# Patient Record
Sex: MALE | Race: WHITE | NOT HISPANIC OR LATINO | Employment: OTHER | URBAN - METROPOLITAN AREA
[De-identification: names, ages, dates, MRNs, and addresses within clinical notes are randomized per-mention and may not be internally consistent; named-entity substitution may affect disease eponyms.]

---

## 2017-01-05 ENCOUNTER — TRANSFERRED RECORDS (OUTPATIENT)
Dept: HEALTH INFORMATION MANAGEMENT | Facility: CLINIC | Age: 62
End: 2017-01-05

## 2017-11-14 LAB
CHOLEST SERPL-MCNC: 200 MG/DL (ref 0–199)
HBA1C MFR BLD: 5.6 % (ref 4.3–5.6)
HDLC SERPL-MCNC: 47 MG/DL
LDLC SERPL CALC-MCNC: 126 MG/DL (ref 0–129)
TRIGL SERPL-MCNC: 137 MG/DL (ref 0–149)

## 2017-11-21 ENCOUNTER — TRANSFERRED RECORDS (OUTPATIENT)
Dept: HEALTH INFORMATION MANAGEMENT | Facility: CLINIC | Age: 62
End: 2017-11-21

## 2017-11-28 ENCOUNTER — TRANSFERRED RECORDS (OUTPATIENT)
Dept: HEALTH INFORMATION MANAGEMENT | Facility: CLINIC | Age: 62
End: 2017-11-28

## 2017-11-30 ENCOUNTER — TRANSFERRED RECORDS (OUTPATIENT)
Dept: HEALTH INFORMATION MANAGEMENT | Facility: CLINIC | Age: 62
End: 2017-11-30

## 2017-12-03 ENCOUNTER — TRANSFERRED RECORDS (OUTPATIENT)
Dept: HEALTH INFORMATION MANAGEMENT | Facility: CLINIC | Age: 62
End: 2017-12-03

## 2018-01-18 ENCOUNTER — TRANSFERRED RECORDS (OUTPATIENT)
Dept: HEALTH INFORMATION MANAGEMENT | Facility: CLINIC | Age: 63
End: 2018-01-18

## 2018-01-30 ENCOUNTER — TRANSFERRED RECORDS (OUTPATIENT)
Dept: HEALTH INFORMATION MANAGEMENT | Facility: CLINIC | Age: 63
End: 2018-01-30

## 2018-07-05 ENCOUNTER — OFFICE VISIT (OUTPATIENT)
Dept: OTOLARYNGOLOGY | Facility: CLINIC | Age: 63
End: 2018-07-05
Payer: COMMERCIAL

## 2018-07-05 VITALS — WEIGHT: 196 LBS | HEIGHT: 71 IN | BODY MASS INDEX: 27.44 KG/M2

## 2018-07-05 DIAGNOSIS — C01 MALIGNANT NEOPLASM OF BASE OF TONGUE (H): Primary | ICD-10-CM

## 2018-07-05 ASSESSMENT — PAIN SCALES - GENERAL: PAINLEVEL: NO PAIN (0)

## 2018-07-05 NOTE — PROGRESS NOTES
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      TREATMENT:  The patient received concurrent chemoradiation therapy and finished treatment on 7/10/2012.  He underwent left neck dissection for residual disease for a positive PET/CT on 10/01/2012.  Pathology report of the neck dissection shows no evidence of carcinoma.      HISTORY OF PRESENT ILLNESS:  Mr. Coleman is a 63-year-old gentleman who is back to the Otolaryngology Clinic after I saw him last time over two years ago.  He is complaining of left-sided preauricular pain.  He states that the pain started with no trigger factors.  He had this pain for about a couple weeks with no improvement but the pain in the last few days went away any totally disappeared.  He is telling me that the pain was worse when he opens his mouth widely.  It is not associated with chewing.  He denies any difficulty swallowing, no dysphagia.  He is not losing any weight.  He denies otalgia.  He does pinpoint to the TMJ on the left.        MEDICATIONS:     Current Outpatient Prescriptions   Medication Sig Dispense Refill     ibuprofen (ADVIL/MOTRIN) 600 MG tablet Take 1 tablet (600 mg) by mouth every 6 hours as needed for pain (mild) 30 tablet 0       ALLERGIES:    Allergies   Allergen Reactions     Vicodin [Hydrocodone-Acetaminophen] Other (See Comments)     Patient gets headache with Vicodin       PAST MEDICAL HISTORY:   Past Medical History:   Diagnosis Date     Cancer of tongue (H)     s/p chemo&rad rx        FAMILY HISTORY:    Family History   Problem Relation Age of Onset     Diabetes Brother      aunt, grandmother     Allergies Mother      Obesity Brother      Psychotic Disorder Sister      Cancer Father      prostate cancer     Hearing Loss No family hx of      Diabetes Brother      Diabetes Maternal Grandmother      Mental Illness Sister      bipolar     Obesity Brother        REVIEW OF SYSTEMS:  12 point ROS was negative other than the  symptoms noted above in the HPI.      PHYSICAL EXAMINATION:   Constitutional: The patient was well-groomed and in no acute distress.    Skin: Warm and pink.    Neurologic: Alert and oriented x3. Cranial nerves III-XII within normal limits. Voice quality is normal.    Psychiatric: The patient's affect was calm, cooperative and appropriate.    Respiratory: Breathing comfortably without stridor or exertion of accessory muscles.    Eyes: Pupils were equal and reactive. Extraocular movements are intact.    Head: Normocephalic and atraumatic. No lesions or scars.    Ears: External auditory canals and tympanic membranes were clear.    Nose: Sinuses were nontender. Anterior rhinoscopy revealed midline septum and absence of purulence or polyps.    Oral cavity/Oropharynx: Normal mucosa.  The oropharynx shows evidence of telangiectasias most likely secondary to radiation therapy.  I do not see any evidence of masses or lesions on today's examination.    Neck: The parotids are soft without masses. Supple with normal laryngeal and tracheal landmarks.  Evidence of left neck dissection.  Lymphatic: There is no palpable lymphadenopathy or other masses in the neck or parotids.       ASSESSMENT AND PLAN:  This is a 63-year-old gentleman with a history of left tongue base squamous cell carcinoma, HPV positive, T2, N2c M0.  The patient had a left preauricular pain that has disappeared.  Today's examination did not reveal any evidence concerning for recurrence or any new or second primary tumors.  Our plan is to see him back on a p.r.n. basis.         ECG/ms     Usha Rojas M.D.  Otolaryngology- Head & Neck Surgery  [phone]

## 2018-07-05 NOTE — PATIENT INSTRUCTIONS
1.  You were seen in the ENT Clinic today by Dr. Rodriguez.  If you have any questions or concerns after your appointment, please call [phone].  Press option #1 for scheduling related needs.  Press option #3 for Nurse advice.  2.  Plan is to return to clinic as needed for cancer surveillance.

## 2018-07-05 NOTE — LETTER
7/5/2018       RE: Karlos Coleman  [address] [facility] [address]     Dear Colleague,    Thank you for referring your patient, Karlos Coleman, to the [facility] EAR NOSE AND THROAT at [facility]. Please see a copy of my visit note below.    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      TREATMENT:  The patient received concurrent chemoradiation therapy and finished treatment on 7/10/2012.  He underwent left neck dissection for residual disease for a positive PET/CT on 10/01/2012.  Pathology report of the neck dissection shows no evidence of carcinoma.      HISTORY OF PRESENT ILLNESS:  Mr. Coleman is a 63-year-old gentleman who is back to the Otolaryngology Clinic after I saw him last time over two years ago.  He is complaining of left-sided preauricular pain.  He states that the pain started with no trigger factors.  He had this pain for about a couple weeks with no improvement but the pain in the last few days went away any totally disappeared.  He is telling me that the pain was worse when he opens his mouth widely.  It is not associated with chewing.  He denies any difficulty swallowing, no dysphagia.  He is not losing any weight.  He denies otalgia.  He does pinpoint to the TMJ on the left.      MEDICATIONS:     Current Outpatient Prescriptions   Medication Sig Dispense Refill     ibuprofen (ADVIL/MOTRIN) 600 MG tablet Take 1 tablet (600 mg) by mouth every 6 hours as needed for pain (mild) 30 tablet 0       ALLERGIES:    Allergies   Allergen Reactions     Vicodin [Hydrocodone-Acetaminophen] Other (See Comments)     Patient gets headache with Vicodin       PAST MEDICAL HISTORY:   Past Medical History:   Diagnosis Date     Cancer of tongue (H)     s/p chemo&rad rx        FAMILY HISTORY:    Family History   Problem Relation Age of Onset     Diabetes Brother      aunt, grandmother     Allergies Mother      Obesity  Brother      Psychotic Disorder Sister      Cancer Father      prostate cancer     Hearing Loss No family hx of      Diabetes Brother      Diabetes Maternal Grandmother      Mental Illness Sister      bipolar     Obesity Brother        REVIEW OF SYSTEMS:  12 point ROS was negative other than the symptoms noted above in the HPI.    PHYSICAL EXAMINATION:   Constitutional: The patient was well-groomed and in no acute distress.    Skin: Warm and pink.    Neurologic: Alert and oriented x3. Cranial nerves III-XII within normal limits. Voice quality is normal.    Psychiatric: The patient's affect was calm, cooperative and appropriate.    Respiratory: Breathing comfortably without stridor or exertion of accessory muscles.    Eyes: Pupils were equal and reactive. Extraocular movements are intact.    Head: Normocephalic and atraumatic. No lesions or scars.    Ears: External auditory canals and tympanic membranes were clear.    Nose: Sinuses were nontender. Anterior rhinoscopy revealed midline septum and absence of purulence or polyps.    Oral cavity/Oropharynx: Normal mucosa.  The oropharynx shows evidence of telangiectasias most likely secondary to radiation therapy.  I do not see any evidence of masses or lesions on today's examination.    Neck: The parotids are soft without masses. Supple with normal laryngeal and tracheal landmarks.  Evidence of left neck dissection.  Lymphatic: There is no palpable lymphadenopathy or other masses in the neck or parotids.       ASSESSMENT AND PLAN:  This is a 63-year-old gentleman with a history of left tongue base squamous cell carcinoma, HPV positive, T2, N2c M0.  The patient had a left preauricular pain that has disappeared.  Today's examination did not reveal any evidence concerning for recurrence or any new or second primary tumors.  Our plan is to see him back on a p.r.n. basis.         ECG/ms Usha Rojas M.D.  Otolaryngology- Head & Neck  Surgery  [phone]

## 2018-07-05 NOTE — MR AVS SNAPSHOT
After Visit Summary   7/5/2018    Karlos Coleman    MRN: [id number]           Patient Information     Date Of Birth          1955        Visit Information        Provider Department      7/5/2018 1:40 PM Usha Rojas MD [facility] Ear Nose and Throat        Today's Diagnoses     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)    -  1      Care Instructions    1.  You were seen in the ENT Clinic today by Dr. Rodriguez.  If you have any questions or concerns after your appointment, please call [phone].  Press option #1 for scheduling related needs.  Press option #3 for Nurse advice.  2.  Plan is to return to clinic as needed for cancer surveillance.                  Follow-ups after your visit        Who to contact     Please call your clinic at [phone] to:    Ask questions about your health    Make or cancel appointments    Discuss your medicines    Learn about your test results    Speak to your doctor            Additional Information About Your Visit        "[email or web address] Information     [employer] gives you secure access to your electronic health record. If you see a primary care provider, you can also send messages to your care team and make appointments. If you have questions, please call your primary care clinic.  If you do not have a primary care provider, please call [phone] and they will assist you.      [employer] is an electronic gateway that provides easy, online access to your medical records. With [employer], you can request a clinic appointment, read your test results, renew a prescription or communicate with your care team.     To access your existing account, please contact your [facility] or call [phone] for assistance.        Care EveryWhere ID     This is your Care EveryWhere ID. This could be used by other organizations to access your [place] medical records  [id number]        Your Vitals Were     Height BMI (Body Mass  "Index)                1.803 m (5' 11\") 27.34 kg/m2           Blood Pressure from Last 3 Encounters:   12/29/16 109/70   12/13/16 118/76   10/20/16 108/78    Weight from Last 3 Encounters:   07/05/18 88.9 kg (196 lb)   12/29/16 88.5 kg (195 lb)   12/13/16 91.2 kg (201 lb 1.6 oz)              Today, you had the following     No orders found for display       Primary Care Provider Office Phone # Fax #    Drew Raines [phone] [address] [facility] [address]        Equal Access to Services     [facility] : Hadii juan scott hadjesus Sotoro, waaxda luqadaha, qaybta kaalmada adedevonyada, wesly sanchez . So [facility] [phone].    ATENCIÓN: Si habla español, tiene a randall disposición servicios gratuitos de asistencia lingüística. Llame al [phone].    We comply with applicable federal civil rights laws and [place] laws. We do not discriminate on the basis of race, color, national origin, age, disability, sex, sexual orientation, or gender identity.            Thank you!     Thank you for choosing [facility] EAR NOSE AND THROAT  for your care. Our goal is always to provide you with excellent care. Hearing back from our patients is one way we can continue to improve our services. Please take a few minutes to complete the written survey that you may receive in the mail after your visit with us. Thank you!             Your Updated Medication List - Protect others around you: Learn how to safely use, store and throw away your medicines at www.disposemymeds.org.          This list is accurate as of 7/5/18 11:59 PM.  Always use your most recent med list.                   Brand Name Dispense Instructions for use Diagnosis    ibuprofen 600 MG tablet    ADVIL/MOTRIN    30 tablet    Take 1 tablet (600 mg) by mouth every 6 hours as needed for pain (mild)    Complex tear of medial meniscus of right knee as current injury, initial encounter         "

## 2018-07-05 NOTE — NURSING NOTE
"Chief Complaint   Patient presents with     RECHECK     mouth pain     Height 1.803 m (5' 11\"), weight 88.9 kg (196 lb).    Jason Jones    "

## 2018-12-03 ENCOUNTER — TRANSFERRED RECORDS (OUTPATIENT)
Dept: HEALTH INFORMATION MANAGEMENT | Facility: CLINIC | Age: 63
End: 2018-12-03

## 2019-10-03 ENCOUNTER — HEALTH MAINTENANCE LETTER (OUTPATIENT)
Age: 64
End: 2019-10-03

## 2020-07-06 ENCOUNTER — VIRTUAL VISIT (OUTPATIENT)
Dept: FAMILY MEDICINE | Facility: OTHER | Age: 65
End: 2020-07-06

## 2020-07-06 NOTE — PROGRESS NOTES
"Date: 2020 09:16:20  Clinician: James Hays  Clinician NPI: [id number]  Patient: Karlos Coleman  Patient : 1955  Patient Address: [address] [place] [address]  Patient Phone: [phone]  Visit Protocol: URI  Patient Summary:  Karlos is a 65 year old ( : 1955 ) male who initiated a Visit for COVID-19 (Coronavirus) evaluation and screening. When asked the question \"Please sign me up to receive news, health information and promotions. \", Karlos responded \"No\".    Karlos states his symptoms started today.   His symptoms consist of malaise, a headache, a sore throat, myalgia, and facial pain or pressure.   Symptom details     Sore throat: Karlos reports having mild throat pain (1-3 on a 10 point pain scale), does not have exudate on his tonsils, and can swallow liquids. He is not sure if the lymph nodes in his neck are enlarged. A rash has not appeared on the skin since the sore throat started.     Facial pain or pressure: The facial pain or pressure does not feel worse when bending or leaning forward.     Headache: He states the headache is mild (1-3 on a 10 point pain scale).      Karlos denies having wheezing, nausea, teeth pain, ageusia, diarrhea, vomiting, rhinitis, ear pain, chills, anosmia, fever, cough, and nasal congestion. He also denies having recent facial or sinus surgery in the past 60 days and taking antibiotic medication in the past month. He is not experiencing dyspnea.   Precipitating events  Within the past week, Karlos has not been exposed to someone with strep throat. He has not recently been exposed to someone with influenza. Karlos has not been in close contact with any high risk individuals.   Pertinent COVID-19 (Coronavirus) information  In the past 14 days, Karlos has not worked in a congregate living setting.   He does not work or volunteer as healthcare worker or a  and does not work or volunteer in a healthcare facility.   Karlos " also has not lived in a congregate living setting in the past 14 days. He does not live with a healthcare worker.   Karlos has not had a close contact with a laboratory-confirmed COVID-19 patient within 14 days of symptom onset.   Pertinent medical history  Karlos does not need a return to work/school note.   Weight: 200 lbs   Karlos does not smoke or use smokeless tobacco.   Additional information as reported by the patient (free text): My wife has symptoms as well. Hers are more severe.   Weight: 200 lbs    MEDICATIONS: No current medications, ALLERGIES: NKDA  Clinician Response:  Dear Karlos,   Your symptoms show that you may have coronavirus (COVID-19). This illness can cause fever, cough and trouble breathing. Many people get a mild case and get better on their own. Some people can get very sick.  What should I do?  We would like to test you for this virus.   1. Please call [phone] to schedule your visit. Explain that you were referred by [facility] to have a COVID-19 test. Be ready to share your Critical access hospital visit ID number.  The following will serve as your written order for this COVID Test, ordered by me, for the indication of suspected COVID [Z20.828]: The test will be ordered in [employer], our electronic health record, after you are scheduled. It will show as ordered and authorized by Jewel Coronado MD.  Order: COVID-19 (Coronavirus) PCR for SYMPTOMATIC testing from Critical access hospital.      2. When it's time for your COVID test:  Stay at least 6 feet away from others. (If someone will drive you to your test, stay in the backseat, as far away from the  as you can.)   Cover your mouth and nose with a mask, tissue or washcloth.  Go straight to the testing site. Don't make any stops on the way there or back.      3.Starting now: Stay home and away from others (self-isolate) until:   You've had no fever---and no medicine that reduces fever---for 3 full days (72 hours). And...   Your other symptoms have gotten better. For  "example, your cough or breathing has improved. And...   At least 10 days have passed since your symptoms started.       During this time, don't leave the house except for testing or medical care.   Stay in your own room, even for meals. Use your own bathroom if you can.   Stay away from others in your home. No hugging, kissing or shaking hands. No visitors.  Don't go to work, school or anywhere else.    Clean \"high touch\" surfaces often (doorknobs, counters, handles, etc.). Use a household cleaning spray or wipes. You'll find a full list of  on the EPA website: www.epa.gov/pesticide-registration/list-n-disinfectants-use-against-sars-cov-2.   Cover your mouth and nose with a mask, tissue or washcloth to avoid spreading germs.  Wash your hands and face often. Use soap and water.  Caregivers in these groups are at risk for severe illness due to COVID-19:  o People 65 years and older  o People who live in a nursing home or long-term care facility  o People with chronic disease (lung, heart, cancer, diabetes, kidney, liver, immunologic)  o People who have a weakened immune system, including those who:   Are in cancer treatment  Take medicine that weakens the immune system, such as corticosteroids  Had a bone marrow or organ transplant  Have an immune deficiency  Have poorly controlled HIV or AIDS  Are obese (body mass index of 40 or higher)  Smoke regularly   o Caregivers should wear gloves while washing dishes, handling laundry and cleaning bedrooms and bathrooms.  o Use caution when washing and drying laundry: Don't shake dirty laundry, and use the warmest water setting that you can.  o For more tips, go to www.cdc.gov/coronavirus/2019-ncov/downloads/10Things.pdf.    4.Sign up for Emir "[employer]. We know it's scary to hear that you might have COVID-19. We want to track your symptoms to make sure you're okay over the next 2 weeks. Please look for an email from Emir Willis---this is a free, online program that " we'll use to keep in touch. To sign up, follow the link in the email. Learn more at [email or web address]  How can I take care of myself?   Get lots of rest. Drink extra fluids (unless a doctor has told you not to).   Take Tylenol (acetaminophen) for fever or pain. If you have liver or kidney problems, ask your family doctor if it's okay to take Tylenol.   Adults can take either:    650 mg (two 325 mg pills) every 4 to 6 hours, or...   1,000 mg (two 500 mg pills) every 8 hours as needed.    Note: Don't take more than 3,000 mg in one day. Acetaminophen is found in many medicines (both prescribed and over-the-counter medicines). Read all labels to be sure you don't take too much.   For children, check the Tylenol bottle for the right dose. The dose is based on the child's age or weight.    If you have other health problems (like cancer, heart failure, an organ transplant or severe kidney disease): Call your specialty clinic if you don't feel better in the next 2 days.       Know when to call 911. Emergency warning signs include:    Trouble breathing or shortness of breath Pain or pressure in the chest that doesn't go away Feeling confused like you haven't felt before, or not being able to wake up Bluish-colored lips or face.  Where can I get more information?   [facility] -- About COVID-19: [email or web address]   CDC -- What to Do If You're Sick: www.cdc.gov/coronavirus/2019-ncov/about/steps-when-sick.html   CDC -- Ending Home Isolation: www.cdc.gov/coronavirus/2019-ncov/hcp/disposition-in-home-patients.html   CDC -- Caring for Someone: www.cdc.gov/coronavirus/2019-ncov/if-you-are-sick/care-for-someone.html   [facility] -- Interim Guidance for Hospital Discharge to Home: www.health.[facility].[place].us/diseases/coronavirus/hcp/hospdischarge.pdf   [facility] clinical trials (COVID-19 research studies): clinicalaffairs.[facility]/umn-clinical-trials    Below are the COVID-19 hotlines at the  [place] Department of Health ([facility]). Interpreters are available.    For health questions: Call 184-875-5693 or 1-247.630.1507 (7 a.m. to 7 p.m.) For questions about schools and childcare: Call 683-854-5385 or 1-990.879.2683 (7 a.m. to 7 p.m.)    Diagnosis: Other malaise  Diagnosis ICD: R53.81

## 2020-07-07 DIAGNOSIS — Z20.822 ENCOUNTER FOR LABORATORY TESTING FOR COVID-19 VIRUS: Primary | ICD-10-CM

## 2020-07-07 PROCEDURE — U0003 INFECTIOUS AGENT DETECTION BY NUCLEIC ACID (DNA OR RNA); SEVERE ACUTE RESPIRATORY SYNDROME CORONAVIRUS 2 (SARS-COV-2) (CORONAVIRUS DISEASE [COVID-19]), AMPLIFIED PROBE TECHNIQUE, MAKING USE OF HIGH THROUGHPUT TECHNOLOGIES AS DESCRIBED BY CMS-2020-01-R: HCPCS | Performed by: NURSE PRACTITIONER

## 2020-07-07 PROCEDURE — 99207 ZZC NO CHARGE LOS: CPT

## 2020-07-07 NOTE — LETTER
July 11, 2020        Karlos Coleman  [address] [address]    This letter provides a written record that you were tested for COVID-19 on 7/7/20.       Your result was negative. This means that we didn t find the virus that causes COVID-19 in your sample. A test may show negative when you do actually have the virus. This can happen when the virus is in the early stages of infection, before you feel illness symptoms.    If you have symptoms   Stay home and away from others (self-isolate) until you meet ALL of the guidelines below:    You ve had no fever--and no medicine that reduces fever--for 3 full days (72 hours). And      Your other symptoms have gotten better. For example, your cough or breathing has improved. And     At least 10 days have passed since your symptoms started.    During this time:    Stay home. Don t go to work, school or anywhere else.     Stay in your own room, including for meals. Use your own bathroom if you can.    Stay away from others in your home. No hugging, kissing or shaking hands. No visitors.    Clean  high touch  surfaces often (doorknobs, counters, handles, etc.). Use a household cleaning spray or wipes. You can find a full list on the EPA website at www.epa.gov/pesticide-registration/list-n-disinfectants-use-against-sars-cov-2.    Cover your mouth and nose with a mask, tissue or washcloth to avoid spreading germs.    Wash your hands and face often with soap and water.    Going back to work  Check with your employer for any guidelines to follow for going back to work.    Employers: This document serves as formal notice that your employee tested negative for COVID-19, as of the testing date shown above.

## 2020-07-08 LAB
SARS-COV-2 RNA SPEC QL NAA+PROBE: NOT DETECTED
SPECIMEN SOURCE: NORMAL

## 2020-07-10 ENCOUNTER — OFFICE VISIT (OUTPATIENT)
Dept: FAMILY MEDICINE | Facility: CLINIC | Age: 65
End: 2020-07-10
Payer: COMMERCIAL

## 2020-07-10 ENCOUNTER — NURSE TRIAGE (OUTPATIENT)
Dept: NURSING | Facility: CLINIC | Age: 65
End: 2020-07-10

## 2020-07-10 VITALS
OXYGEN SATURATION: 96 % | TEMPERATURE: 97.6 F | SYSTOLIC BLOOD PRESSURE: 117 MMHG | DIASTOLIC BLOOD PRESSURE: 77 MMHG | RESPIRATION RATE: 12 BRPM | BODY MASS INDEX: 28.56 KG/M2 | WEIGHT: 204 LBS | HEART RATE: 70 BPM | HEIGHT: 71 IN

## 2020-07-10 DIAGNOSIS — R51.9 NONINTRACTABLE HEADACHE, UNSPECIFIED CHRONICITY PATTERN, UNSPECIFIED HEADACHE TYPE: ICD-10-CM

## 2020-07-10 DIAGNOSIS — J02.9 SORE THROAT: Primary | ICD-10-CM

## 2020-07-10 PROCEDURE — 99203 OFFICE O/P NEW LOW 30 MIN: CPT | Performed by: PHYSICIAN ASSISTANT

## 2020-07-10 ASSESSMENT — PAIN SCALES - GENERAL: PAINLEVEL: MILD PAIN (2)

## 2020-07-10 ASSESSMENT — ENCOUNTER SYMPTOMS
NERVOUS/ANXIOUS: 0
NAUSEA: 0
SINUS PAIN: 0
TROUBLE SWALLOWING: 0
COUGH: 0
SINUS PRESSURE: 0
RHINORRHEA: 0
VOMITING: 0
LIGHT-HEADEDNESS: 0
SORE THROAT: 1
FACIAL SWELLING: 0
ABDOMINAL PAIN: 0
VOICE CHANGE: 0
SHORTNESS OF BREATH: 0
FEVER: 0

## 2020-07-10 ASSESSMENT — MIFFLIN-ST. JEOR: SCORE: 1742.47

## 2020-07-10 NOTE — PROGRESS NOTES
Subjective     Karlos Coleman is a 65 year old male who presents to clinic today for the following health issues:    HPI     Patient notes 6 days of sore neck/throat bilaterally and headache rated 1/10. Wife had URI symptoms also. Both patient and wife were COVID19 negative as of 7/7/10.     ENT Symptoms             Symptoms: cc Present Absent Comment   Fever/Chills   x    Fatigue   x    Muscle Aches   x    Eye Irritation   x    Sneezing   x    Nasal Maxwell/Drg   x    Sinus Pressure/Pain   x    Loss of smell   x    Dental pain   x    Sore Throat  x  Very little   Swollen Glands   x    Ear Pain/Fullness   x    Cough   x    Wheeze   x    Chest Pain   x    Shortness of breath   x    Rash   x    Other  x  Headache, light headed     Symptom duration:  5 days   Symptom severity:  moderate   Treatments tried:  iburofen   Contacts:  none was covid tested Tuesday 7th was negative       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)     Subjective tinnitus     Mass of left side of neck     Advanced directives, counseling/discussion     Benign prostate hyperplasia     Past Surgical History:   Procedure Laterality Date     ABDOMEN SURGERY      feeding tube plcmnt     ARTHROSCOPY KNEE WITH MEDIAL MENISCECTOMY Right 12/29/2016    Procedure: ARTHROSCOPY KNEE WITH MEDIAL MENISCECTOMY;  Surgeon: Jeromy Cohn MD;  Location: [facility]      ORAL SURGERY PROCEDURE  5/7/12    extractions&root canals     DISSECTION RADICAL NECK MODIFIED  10/1/2012    Procedure: DISSECTION RADICAL NECK MODIFIED;;  Surgeon: Usha Rojas MD;  Location: [facility]     INSERT PORT VASCULAR ACCESS  5/17/2012    Procedure:INSERT PORT VASCULAR ACCESS; Right Chest Vascular Access Port Insertion, Percutaneous Gastrostomy Tube Insertion (c-arm); Surgeon:ANIBAL CUNNINGHAM;  Location:[facility]     LARYNGOSCOPY, ESOPHAGOSCOPY,  BIOPSY, COMBINED  10/1/2012    Procedure: COMBINED LARYNGOSCOPY, ESOPHAGOSCOPY,  BIOPSY;  Direct Laryngoscopy, Left Modified Radical Neck Dissection   *Latex Safe*;  Surgeon: Latisha Rojas MD;  Location: [facility]     LASER CO2 LARYNGOSCOPY  4/23/2012    Procedure:LASER CO2 LARYNGOSCOPY; Direct Laryngoscopy with Biopsies, Excision Base of Tongue Lesion CO2 Laser; Surgeon:LATISHA ROJAS; Location[facility]     LASER CO2 LESION ORAL  4/23/2012    Procedure:LASER CO2 LESION ORAL; Surgeon:LATISHA ROJAS; [facility]     REMOVE PORT VASCULAR ACCESS  12/12/2012    Procedure: REMOVE PORT VASCULAR ACCESS;  Right vascular access port removal and removal of foreign object in right middle digit.;  Surgeon: Shae Marte MD;  Location: [facility]       Social History     Tobacco Use     Smoking status: Never Smoker     Smokeless tobacco: Never Used   Substance Use Topics     Alcohol use: No     Comment: none     Family History   Problem Relation Age of Onset     Diabetes Brother         aunt, grandmother     Allergies Mother      Obesity Brother      Psychotic Disorder Sister      Mental Illness Sister         bipolar     Cancer Father         prostate cancer     Diabetes Maternal Grandmother      Hearing Loss No family hx of          Current Outpatient Medications   Medication Sig Dispense Refill     ibuprofen (ADVIL/MOTRIN) 600 MG tablet Take 1 tablet (600 mg) by mouth every 6 hours as needed for pain (mild) 30 tablet 0     Allergies   Allergen Reactions     Vicodin [Hydrocodone-Acetaminophen] Other (See Comments)     Patient gets headache with Vicodin       Reviewed and updated as needed this visit by Provider         Review of Systems   Constitutional: Negative for fever.   HENT: Positive for sore throat. Negative for congestion, ear pain, facial swelling, rhinorrhea, sinus pressure, sinus pain, sneezing, trouble swallowing  "and voice change.    Respiratory: Negative for cough and shortness of breath.    Cardiovascular: Negative for chest pain.   Gastrointestinal: Negative for abdominal pain, nausea and vomiting.   Skin: Negative for rash.   Neurological: Negative for light-headedness.   Psychiatric/Behavioral: The patient is not nervous/anxious.             Objective    /77 (BP Location: Right arm, Patient Position: Chair, Cuff Size: Adult Large)   Pulse 70   Temp 97.6  F (36.4  C) (Tympanic)   Resp 12   Ht 1.803 m (5' 11\")   Wt 92.5 kg (204 lb)   SpO2 96%   BMI 28.45 kg/m    Body mass index is 28.45 kg/m .  Physical Exam  Vitals signs and nursing note reviewed.   Constitutional:       General: He is not in acute distress.     Appearance: Normal appearance.   HENT:      Head: Normocephalic and atraumatic.      Nose: No congestion or rhinorrhea.      Mouth/Throat:      Mouth: Mucous membranes are moist.      Pharynx: Oropharynx is clear.   Eyes:      Extraocular Movements: Extraocular movements intact.      Pupils: Pupils are equal, round, and reactive to light.   Neck:      Musculoskeletal: Normal range of motion.   Cardiovascular:      Rate and Rhythm: Normal rate and regular rhythm.      Heart sounds: Normal heart sounds.   Pulmonary:      Effort: Pulmonary effort is normal.      Breath sounds: Normal breath sounds.   Musculoskeletal: Normal range of motion.   Lymphadenopathy:      Cervical: No cervical adenopathy.   Skin:     General: Skin is warm and dry.   Neurological:      General: No focal deficit present.      Mental Status: He is alert.   Psychiatric:         Mood and Affect: Mood normal.         Behavior: Behavior normal.            Diagnostic Test Results:  Labs reviewed in Epic        Assessment & Plan     1. Sore throat  2. Nonintractable headache, unspecified chronicity pattern, unspecified headache type  Physical exam without acute abnormalities.  Vital signs normal.  Given exam findings, low suspicion for " strep pharyngitis.  COVID-19 negative.  Symptoms are likely viral in nature.  Discussed symptomatic treatment with Tylenol/ibuprofen, rest, and hydration.  Recommended follow-up if symptoms not improving over the next week, or if symptoms are worsening.    See Patient Instructions    Return in about 1 week (around 7/17/2020), or if symptoms worsen or fail to improve.    Maureen Mahoney PA-C  [facility]

## 2020-07-10 NOTE — PATIENT INSTRUCTIONS
Your exam is reassuring today.  Your symptoms are likely due to a virus.  Your COVID-19 testing was negative.  Please make sure to get plenty of fluids and rest.  For headache, sore throat, or fever please use Tylenol/ibuprofen.  Contact clinic if her symptoms are not improving over the next 1 to 2 weeks.    Patient Education     Viral Pharyngitis (Sore Throat)  You or your child have pharyngitis (sore throat). This infection is caused by a virus. It can cause throat pain that is worse when swallowing, aching all over, headache, and fever. The infection may be spread by coughing, kissing, or touching others after touching your mouth or nose. Antibiotic medicines do not work against viruses. They are not used for treating this illness.  Home care    If symptoms are severe, you or your child should rest at home. Return to work or school when you or your child feel well enough.     You or your child should drink plenty of fluids to prevent dehydration.    Use throat lozenges or numbing throat sprays to help reduce pain. Gargling with warm salt water will also help reduce throat pain. Dissolve 1/2 teaspoon of salt in 1 glass of warm water. Children can sip on juice or a popsicle. Children 5 years and older can also suck on a lollipop or hard candy.    Don t eat salty or spicy foods or give them to your child. These can be irritating to the throat.  Medicines for a child: You can give your child acetaminophen for fever, fussiness, or discomfort. In babies over 6 months of age, you may use ibuprofen instead of acetaminophen. If your child has chronic liver or kidney disease or ever had a stomach ulcer or GI bleeding, talk with your child s healthcare provider before giving these medicines. Aspirin should never be used by any child under 18 years of age who has a fever. It may cause severe liver damage.  Medicines for an adult: You may use acetaminophen or ibuprofen to control pain or fever, unless another medicine was  prescribed for this. If you have chronic liver or kidney disease or ever had a stomach ulcer or GI bleeding, talk with your healthcare provider before using these medicines.  Follow-up care  Follow up with a healthcare provider or our staff if you or your child are not getting better over the next week.  When to seek medical advice  Call your healthcare provider right away if any of these occur:    Fever as directed by your healthcare provider.  For children, seek care if:  ? Your child is of any age and has repeated fevers above 104 F (40 C).  ? Your child is younger than 2 years of age and has a fever of 100.4 F (38 C) for more than 1 day.  ? Your child is 2 years old or older and has a fever of 100.4 F (38 C) for more than 3 days.    New or worsening ear pain, sinus pain, or headache    Painful lumps in the back of neck    Stiff neck    Lymph nodes are getting larger    Can t swallow liquids, a lot of drooling, or can t open mouth wide due to throat pain    Signs of dehydration, such as very dark urine or no urine, sunken eyes, dizziness    Trouble breathing or noisy breathing    Muffled voice    New rash    Other symptoms are getting worse  Date Last Reviewed: 10/1/2017    2414-4342 [employer]. [address] [place] [address], [place] [address]. All rights reserved. This information is not intended as a substitute for professional medical care. Always follow your healthcare professional's instructions.            yes

## 2020-07-10 NOTE — TELEPHONE ENCOUNTER
Headache and tender glands along jaw line since 7/5/20.  Negative covid - tested on 7/7/20  Lightheadedness at times  Sore throat off and on.    No fever  Breathing okay  No cough  No new body aches.    Has benign prostate hyperplasia  Had oroharyngeal cancer, tongue cancer - 2012    Recommended he be seen within the next few days.  He agreed.  Transferred to [facility].    COVID 19 Nurse Triage Plan/Patient Instructions    Please be aware that novel coronavirus (COVID-19) may be circulating in the community. If you develop symptoms such as fever, cough, or SOB or if you have concerns about the presence of another infection including coronavirus (COVID-19), please contact your health care provider or visit [email or web address].     Disposition/Instructions    In-Person Visit with provider recommended. Reference Visit Selection Guide.    Thank you for taking steps to prevent the spread of this virus.  o Limit your contact with others.  o Wear a simple mask to cover your cough.  o Wash your hands well and often.    Resources    [facility]: About COVID-19: [email or web address]    CDC: What to Do If You're Sick: www.cdc.gov/coronavirus/2019-ncov/about/steps-when-sick.html    CDC: Ending Home Isolation: www.cdc.gov/coronavirus/2019-ncov/hcp/disposition-in-home-patients.html     CDC: Caring for Someone: www.cdc.gov/coronavirus/2019-ncov/if-you-are-sick/care-for-someone.html     [facility]: Interim Guidance for Hospital Discharge to Home: www.health.[facility].mn.us/diseases/coronavirus/hcp/hospdischarge.pdf    [facility] clinical trials (COVID-19 research studies): clinicalaffairs.[email or web address]-clinical-trials     Below are the COVID-19 hotlines at the [facility] of Health ([facility]). Interpreters are available.   o For health questions: Call 785-700-1216 or 1-817.599.1408 (7 a.m. to 7 p.m.)  o For questions about schools and childcare: Call 437-998-0852 or 1-999.626.7093 (7 a.m. to 7 p.m.)     Additional  Information    Negative: Severe difficulty breathing (e.g., struggling for each breath, speaks in single words)    Negative: [1] Node is in the neck AND [2] causes difficulty breathing    Negative: [1] Node is in the neck AND [2] can't swallow fluids    Negative: Fever > 103 F (39.4 C)    Negative: [1] Lump or swelling in groin AND [2] pulsating (like heartbeat)    Negative: Patient sounds very sick or weak to the triager    Negative: [1] Single large node AND [2] size > 1 inch (2.5 cm) AND [3] fever    Negative: [1] Overlying skin is red AND [2] fever    Negative: [1] Single large node AND [2] size > 1 inch (2.5 cm) AND [3] no fever    Negative: Rapid increase in size of node over several hours    Negative: [1] Overlying skin is red AND [2] no fever    Negative: [1] Tender node in the groin AND [2] has a sore, scratch, cut or painful red area on that leg    Negative: [1] Tender node in the armpit AND [2] has a sore, scratch, cut or painful red area on that arm    Negative: [1] Tender node in the neck AND [2] also has a sore throat AND [3] minimal/no runny nose or cough    Negative: Fever present > 3 days (72 hours)    Negative: Large nodes at multiple locations    [1] Very tender to the touch AND [2] no fever    Protocols used: LYMPH NODES [id number]    Carol HWANG RN [facility]

## 2020-07-23 ENCOUNTER — TELEPHONE (OUTPATIENT)
Dept: FAMILY MEDICINE | Facility: CLINIC | Age: 65
End: 2020-07-23

## 2020-07-23 NOTE — TELEPHONE ENCOUNTER
Patient would like to know if a PSA can just be ordered instead of having to come in to see a provider tomorrow. Please call to advise. Carrie Patel TC/Pt Rep

## 2020-07-24 NOTE — TELEPHONE ENCOUNTER
Call placed to patient.  Advised need for annual exam as it has been years since patient was seen at [facility].  Patient does plan to establish care at [facility] as his insurance will now allow.  Patient will call later to schedule this appointment and have labs completed at visit.  Anjelica Calderon RN

## 2020-08-13 ENCOUNTER — OFFICE VISIT (OUTPATIENT)
Dept: FAMILY MEDICINE | Facility: CLINIC | Age: 65
End: 2020-08-13
Payer: COMMERCIAL

## 2020-08-13 VITALS
HEART RATE: 61 BPM | TEMPERATURE: 97.5 F | RESPIRATION RATE: 12 BRPM | WEIGHT: 201.2 LBS | BODY MASS INDEX: 28.17 KG/M2 | SYSTOLIC BLOOD PRESSURE: 123 MMHG | DIASTOLIC BLOOD PRESSURE: 82 MMHG | HEIGHT: 71 IN

## 2020-08-13 DIAGNOSIS — Z23 NEED FOR VACCINATION: ICD-10-CM

## 2020-08-13 DIAGNOSIS — Z11.59 ENCOUNTER FOR HEPATITIS C SCREENING TEST FOR LOW RISK PATIENT: Primary | ICD-10-CM

## 2020-08-13 DIAGNOSIS — Z11.4 SCREENING FOR HIV (HUMAN IMMUNODEFICIENCY VIRUS): ICD-10-CM

## 2020-08-13 DIAGNOSIS — C01 MALIGNANT NEOPLASM OF BASE OF TONGUE (H): ICD-10-CM

## 2020-08-13 DIAGNOSIS — C61 PROSTATE CANCER (H): ICD-10-CM

## 2020-08-13 DIAGNOSIS — Z13.220 SCREENING FOR HYPERLIPIDEMIA: ICD-10-CM

## 2020-08-13 DIAGNOSIS — Z00.01 ENCOUNTER FOR ROUTINE ADULT MEDICAL EXAM WITH ABNORMAL FINDINGS: ICD-10-CM

## 2020-08-13 DIAGNOSIS — Z00.00 ROUTINE GENERAL MEDICAL EXAMINATION AT A HEALTH CARE FACILITY: ICD-10-CM

## 2020-08-13 DIAGNOSIS — Z12.11 SCREEN FOR COLON CANCER: ICD-10-CM

## 2020-08-13 LAB
GLUCOSE SERPL-MCNC: 81 MG/DL (ref 70–99)
PSA SERPL-MCNC: 6.04 UG/L (ref 0–4)

## 2020-08-13 PROCEDURE — 82947 ASSAY GLUCOSE BLOOD QUANT: CPT | Performed by: FAMILY MEDICINE

## 2020-08-13 PROCEDURE — 87389 HIV-1 AG W/HIV-1&-2 AB AG IA: CPT | Performed by: FAMILY MEDICINE

## 2020-08-13 PROCEDURE — 84153 ASSAY OF PSA TOTAL: CPT | Performed by: FAMILY MEDICINE

## 2020-08-13 PROCEDURE — 90750 HZV VACC RECOMBINANT IM: CPT | Performed by: FAMILY MEDICINE

## 2020-08-13 PROCEDURE — 36415 COLL VENOUS BLD VENIPUNCTURE: CPT | Performed by: FAMILY MEDICINE

## 2020-08-13 PROCEDURE — 86803 HEPATITIS C AB TEST: CPT | Performed by: FAMILY MEDICINE

## 2020-08-13 PROCEDURE — 90471 IMMUNIZATION ADMIN: CPT | Performed by: FAMILY MEDICINE

## 2020-08-13 PROCEDURE — 99396 PREV VISIT EST AGE 40-64: CPT | Mod: 25 | Performed by: FAMILY MEDICINE

## 2020-08-13 ASSESSMENT — PAIN SCALES - GENERAL: PAINLEVEL: NO PAIN (1)

## 2020-08-13 ASSESSMENT — MIFFLIN-ST. JEOR: SCORE: 1729.77

## 2020-08-13 NOTE — PATIENT INSTRUCTIONS
Please stretch your shoulder by grabbing a broom handle with both hands spread apart.  Elevate and stretch your sore shoulder by raising that arm with your other arm. Your sore arm is just hanging on to the handle and the handle does the stretching.    Do hanging arm rolls.    Lay on you side and rotate up your sore arm.  After a week increase weight.    Ice your shoulder when sore.    Behind the back shoulder stretch:  Pace your sore arm behind your back by your waist.  With your other arm swig a towel over your shoulder and have it bangle down to your hand on yoursore arm.  Grab the towel, and the pull pull sore arm up to your shoulder blades with your good arm pulling up on the towel.        Preventive Health Recommendations  Male Ages 50 - 64    Yearly exam:             See your health care provider every year in order to  o   Review health changes.   o   Discuss preventive care.    o   Review your medicines if your doctor has prescribed any.     Have a cholesterol test every 5 years, or more frequently if you are at risk for high cholesterol/heart disease.     Have a diabetes test (fasting glucose) every three years. If you are at risk for diabetes, you should have this test more often.     Have a colonoscopy at age 50, or have a yearly FIT test (stool test). These exams will check for colon cancer.      Talk with your health care provider about whether or not a prostate cancer screening test (PSA) is right for you.    You should be tested each year for STDs (sexually transmitted diseases), if you re at risk.     Shots: Get a flu shot each year. Get a tetanus shot every 10 years.     Nutrition:    Eat at least 5 servings of fruits and vegetables daily.     Eat whole-grain bread, whole-wheat pasta and brown rice instead of white grains and rice.     Get adequate Calcium and Vitamin D.     Lifestyle    Exercise for at least 150 minutes a week (30 minutes a day, 5 days a week). This will help you control your  weight and prevent disease.     Limit alcohol to one drink per day.     No smoking.     Wear sunscreen to prevent skin cancer.     See your dentist every six months for an exam and cleaning.     See your eye doctor every 1 to 2 years.

## 2020-08-13 NOTE — PROGRESS NOTES
3  SUBJECTIVE:   CC: Karlos Coleman is an 65 year old male who presents for preventive health visit.     Healthy Habits:    Do you get at least three servings of calcium containing foods daily (dairy, green leafy vegetables, etc.)? yes    Amount of exercise or daily activities, outside of work: 5-6 day(s) per week    Problems taking medications regularly not applicable    Medication side effects: No    Have you had an eye exam in the past two years? no    Do you see a dentist twice per year? no    Do you have sleep apnea, excessive snoring or daytime drowsiness?no    Patient informed that anything we discuss that is not related to preventative medicine, may be billed for; patient verbalizes understanding.      -He needs to discuss getting a colonoscopy and possibly the shingles vaccine.  -He has a sore right shoulder that he is wanting to discuss.   -He also has a recent virus (was not covid) and wanting to follow up on this.      Today's PHQ-2 Score:   PHQ-2 ( 1999 Pfizer) 8/13/2020 7/10/2020   Q1: Little interest or pleasure in doing things 0 0   Q2: Feeling down, depressed or hopeless 0 0   PHQ-2 Score 0 0       Abuse: Current or Past(Physical, Sexual or Emotional)- No  Do you feel safe in your environment? Yes    Have you ever done Advance Care Planning? (For example, a Health Directive, POLST, or a discussion with a medical provider or your loved ones about your wishes): No, advance care planning information given to patient to review.  Advanced care planning was discussed at today's visit.    Social History     Tobacco Use     Smoking status: Never Smoker     Smokeless tobacco: Never Used   Substance Use Topics     Alcohol use: No     Comment: none     If you drink alcohol do you typically have >3 drinks per day or >7 drinks per week? No                      Last PSA:   PSA   Date Value Ref Range Status   08/13/2020 6.04 (H) 0 - 4 ug/L Final     Comment:     Assay Method:  Chemiluminescence using  Siemens Vista analyzer       Reviewed orders with patient. 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)     Subjective tinnitus     Mass of left side of neck     Advanced directives, counseling/discussion     Benign prostate hyperplasia     Past Surgical History:   Procedure Laterality Date     ABDOMEN SURGERY      feeding tube plcmnt     ARTHROSCOPY KNEE WITH MEDIAL MENISCECTOMY Right 12/29/2016    Procedure: ARTHROSCOPY KNEE WITH MEDIAL MENISCECTOMY;  Surgeon: Jeromy Cohn MD;  Location: [facility] ORAL SURGERY PROCEDURE  5/7/12    extractions&root canals     DISSECTION RADICAL NECK MODIFIED  10/1/2012    Procedure: DISSECTION RADICAL NECK MODIFIED;;  Surgeon: Latisha Rojas MD;  Location:  OR     INSERT PORT VASCULAR ACCESS  5/17/2012    Procedure:INSERT PORT VASCULAR ACCESS; Right Chest Vascular Access Port Insertion, Percutaneous Gastrostomy Tube Insertion (c-arm); Surgeon:ANIBAL CUNNINGHAM; Location: OR     LARYNGOSCOPY, ESOPHAGOSCOPY,  BIOPSY, COMBINED  10/1/2012    Procedure: COMBINED LARYNGOSCOPY, ESOPHAGOSCOPY,  BIOPSY;  Direct Laryngoscopy, Left Modified Radical Neck Dissection   *Latex Safe*;  Surgeon: Latisha Rojas MD;  Location:  OR     LASER CO2 LARYNGOSCOPY  4/23/2012    Procedure:LASER CO2 LARYNGOSCOPY; Direct Laryngoscopy with Biopsies, Excision Base of Tongue Lesion CO2 Laser; Surgeon:LATISHA ROJAS; Location: OR      LASER CO2 LESION ORAL  4/23/2012    Procedure:LASER CO2 LESION ORAL; Surgeon:LATISHA PABLO; Location:[facility] OR     REMOVE PORT VASCULAR ACCESS  12/12/2012    Procedure: REMOVE PORT VASCULAR ACCESS;  Right vascular access port removal and removal of foreign object in right middle digit.;  Surgeon: Shae Marte MD;  Location: [facility] OR       Social History     Tobacco Use     Smoking status: Never Smoker     Smokeless tobacco: Never Used   Substance Use Topics     Alcohol use: No     Comment: none     Family History   Problem Relation Age of Onset     Diabetes Brother         aunt, grandmother     Allergies Mother      Obesity Brother      Psychotic Disorder Sister      Mental Illness Sister         bipolar     Cancer Father         prostate cancer     Diabetes Maternal Grandmother      Hearing Loss No family hx of          Current Outpatient Medications   Medication Sig Dispense Refill     ibuprofen (ADVIL/MOTRIN) 600 MG tablet Take 1 tablet (600 mg) by mouth every 6 hours as needed for pain (mild) (Patient not taking: Reported on 8/13/2020) 30 tablet 0     Allergies   Allergen Reactions     Vicodin [Hydrocodone-Acetaminophen] Other (See Comments)     Patient gets headache with Vicodin       Reviewed and updated as needed this visit by clinical staff  Tobacco  Allergies  Meds  Med Hx  Surg Hx  Fam Hx  Soc Hx        Reviewed and updated as needed this visit by Provider            ROS:  CONSTITUTIONAL: NEGATIVE for fever, chills, change in weight  INTEGUMENTARY/SKIN: NEGATIVE for worrisome rashes, moles or lesions  EYES: NEGATIVE for vision changes or irritation  ENT: NEGATIVE for ear, mouth and throat problems  RESP: NEGATIVE for significant cough or SOB  CV: NEGATIVE for chest pain, palpitations or peripheral edema  GI: NEGATIVE for nausea, abdominal pain, heartburn, or change in bowel habits   male: negative for dysuria, hematuria, decreased urinary stream, erectile dysfunction,  "urethral discharge  MUSCULOSKELETAL: NEGATIVE for significant arthralgias or myalgia  NEURO: NEGATIVE for weakness, dizziness or paresthesias  PSYCHIATRIC: NEGATIVE for changes in mood or affect    OBJECTIVE:   /82   Pulse 61   Temp 97.5  F (36.4  C) (Tympanic)   Resp 12   Ht 1.803 m (5' 11\")   Wt 91.3 kg (201 lb 3.2 oz)   BMI 28.06 kg/m    EXAM:  GENERAL: healthy, alert and no distress  NECK: no adenopathy, no asymmetry, masses, or scars and thyroid normal to palpation  RESP: lungs clear to auscultation - no rales, rhonchi or wheezes  CV: regular rate and rhythm, normal S1 S2, no S3 or S4, no murmur, click or rub, no peripheral edema and peripheral pulses strong  ABDOMEN: soft, nontender, no hepatosplenomegaly, no masses and bowel sounds normal  MS: no gross musculoskeletal defects noted, no edema    Diagnostic Test Results:  Labs reviewed in Epic    ASSESSMENT/PLAN:   1. Routine general medical examination at a health care facility    - GLUCOSE    2. Encounter for routine adult medical exam with abnormal findings      3. Screening for HIV (human immunodeficiency virus)    - HIV Screening    4. Screen for colon cancer  - GASTROENTEROLOGY ADULT REF PROCEDURE ONLY; Future    5. Screening for hyperlipidemia  - Lipid panel reflex to direct LDL Fasting; Future    6. 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)  Good control.  Continue currant medications, continue to monito     7. Encounter for hepatitis C screening test for low risk patient  - Hepatitis C Screen Reflex to HCV RNA Quant and Genotype    8. Prostate cancer (H)  Good control.  Continue currant medications, continue to monito   - PSA, tumor marker    9. Need for vaccination    - ZOSTER VACCINE RECOMBINANT ADJUVANTED IM NJX  - ADMIN 1st VACCINE    COUNSELING:  Reviewed preventive health counseling, as reflected in patient instructions       Regular exercise       Healthy diet/nutrition       Colon cancer screening       Prostate cancer " "screening    Estimated body mass index is 28.06 kg/m  as calculated from the following:    Height as of this encounter: 1.803 m (5' 11\").    Weight as of this encounter: 91.3 kg (201 lb 3.2 oz).    Weight management plan: Discussed healthy diet and exercise guidelines     reports that he has never smoked. He has never used smokeless tobacco.      Counseling Resources:  ATP IV Guidelines  Pooled Cohorts Equation Calculator  FRAX Risk Assessment  ICSI Preventive Guidelines  Dietary Guidelines for Americans, 2010  USDA's MyPlate  ASA Prophylaxis  Lung CA Screening    Drew Raines MD  [facility]  "

## 2020-08-13 NOTE — LETTER
2020      RE Patient: Karlos Coleman  1955  [address] [facility]  [place] [address]      Attn:   [facility]    Fax: [phone]       Patient: Karlos Coleman    1955      Resulted Orders   GLUCOSE   Result Value Ref Range    Glucose 81 70 - 99 mg/dL   Hepatitis C Screen Reflex to HCV RNA Quant and Genotype   Result Value Ref Range    Hepatitis C Antibody Nonreactive NR^Nonreactive      Comment:      Assay performance characteristics have not been established for newborns,   infants, and children     HIV Screening   Result Value Ref Range    HIV Antigen Antibody Combo Nonreactive NR^Nonreactive          Comment:      HIV-1 p24 Ag & HIV-1/HIV-2 Ab Not Detected   PSA, tumor marker   Result Value Ref Range    PSA 6.04 (H) 0 - 4 ug/L      Comment:      Assay Method:  Chemiluminescence using Siemens Vista analyzer         Sincerely,    Drew Raines MD/sc

## 2020-08-14 ENCOUNTER — NURSE TRIAGE (OUTPATIENT)
Dept: NURSING | Facility: CLINIC | Age: 65
End: 2020-08-14

## 2020-08-14 LAB
HCV AB SERPL QL IA: NONREACTIVE
HIV 1+2 AB+HIV1 P24 AG SERPL QL IA: NONREACTIVE

## 2020-08-14 NOTE — TELEPHONE ENCOUNTER
"Patient states he received shingles vaccine yesterday and has some body aches, headache and feels \"sluggish\".  Asking if he can take Advil.  "

## 2020-08-20 ENCOUNTER — TELEPHONE (OUTPATIENT)
Dept: FAMILY MEDICINE | Facility: CLINIC | Age: 65
End: 2020-08-20

## 2020-08-20 NOTE — TELEPHONE ENCOUNTER
Spoke with patient on 8/20/2020 9:48 AM and discussed his elevated PSA levels and normal HIV, hepatitis, and glucose results.  Patient's urologist retired and he did not have his PSA levels checked last year.  Patient would like to discuss with his wife in regards to urology referral. He declines a urology referral at this time.

## 2020-08-20 NOTE — TELEPHONE ENCOUNTER
Reason for Call:  Request for results:    Name of test or procedure: Ulysses looking for his results.  He is especially looking for his number for PSA.  Please review and advise in Dr. Raines's absence.  Thank you..Shaina Pena    Date of test of procedure: 8/13/20    Location of the test or procedure: Had PSA, HIV, Hepatitis C and Glucose.      OK to leave the result message on voice mail or with a family member? YES    Phone number Patient can be reached at:  Home number on file [phone] (home)    Call taken on 8/20/2020 at 9:21 AM by Shaina ePna

## 2020-11-07 ENCOUNTER — HEALTH MAINTENANCE LETTER (OUTPATIENT)
Age: 65
End: 2020-11-07

## 2021-04-10 ENCOUNTER — IMMUNIZATION (OUTPATIENT)
Dept: NURSING | Facility: CLINIC | Age: 66
End: 2021-04-10
Payer: COMMERCIAL

## 2021-04-10 PROCEDURE — 0011A PR COVID VAC MODERNA 100 MCG/0.5 ML IM: CPT

## 2021-04-10 PROCEDURE — 91301 PR COVID VAC MODERNA 100 MCG/0.5 ML IM: CPT

## 2021-05-08 ENCOUNTER — IMMUNIZATION (OUTPATIENT)
Dept: NURSING | Facility: CLINIC | Age: 66
End: 2021-05-08
Attending: INTERNAL MEDICINE
Payer: COMMERCIAL

## 2021-05-08 PROCEDURE — 0012A PR COVID VAC MODERNA 100 MCG/0.5 ML IM: CPT

## 2021-05-08 PROCEDURE — 91301 PR COVID VAC MODERNA 100 MCG/0.5 ML IM: CPT

## 2021-06-01 ENCOUNTER — TELEPHONE (OUTPATIENT)
Dept: GASTROENTEROLOGY | Facility: OUTPATIENT CENTER | Age: 66
End: 2021-06-01

## 2021-06-01 NOTE — TELEPHONE ENCOUNTER
Screening Questions  1. What insurance is in the chart? [employer]    2.  Ordering/Referring Provider: AL YANES    3. BMI 27.8    4. Are you on daily home oxygen? NO    5. Do you have a history of difficult airway? NO    6. Have you had a heart, lung, or liver transplant? NO    7. Are you currently on dialysis? NO    8. Have you had a stroke or Transient ischemic atttack (TIA) within 6 months? NO    9. In the past 6 months, have you had any heart related issues including cardiomyopathy or heart attack?         If yes, did it require cardiac stenting or other implantable device?NO    10. Do you have any implantable devices in your body (pacemaker, defib, LVAD)? NO    11. Do you take nitroglycerin? If yes, how often? NO    12. Are you currently taking any blood thinners?NO    13. Are you a diabetic? NO    14. (Females) Are you currently pregnant? NO  If yes, how many weeks?    15. Have you had a procedure in the past that was difficult to tolerate with conscious sedation? Any allergies to Fentanyl or Versed NO    16. Are you taking any scheduled prescription narcotics more than once daily? NO    17. Do you have any chemical dependencies such as alcohol, street drugs, or methadone? NO    18. Do you have any history of post-traumatic stress syndrome or mental health issues? NO    19. Do you transfer independently? YES    20.  Do you have any issues with constipation? NO    21. Preferred Pharmacy for Pre Prescription [facility]    Scheduling Details    Procedure Scheduled: COLONOSCOPY  Provider/Surgeon: WILIAM  Date of Procedure: 7-29  Location: [facility]  Caller (Please ask for phone number if not scheduled by patient): SELF      Sedation Type: CS  Conscious Sedation- Needs  for 6 hours after the procedure  MAC/General-Needs  for 24 hours after procedure    Pre-op Required at [facility] and OR for  MAC sedation:   (if yes advise patient they will need a pre-op prior to procedure)      Is patient  on blood thinners? -NO (If yes- inform patient to follow up with PCP or provider for follow up instructions)     Informed patient they will need an adult  YES  Cannot take any type of public or medical transportation alone    Informed Patient of COVID Test Requirement YES    Confirmed Nurse will call to complete assessment YES    Additional comments: NONE

## 2021-06-11 DIAGNOSIS — Z11.59 ENCOUNTER FOR SCREENING FOR OTHER VIRAL DISEASES: ICD-10-CM

## 2021-07-21 ENCOUNTER — TELEPHONE (OUTPATIENT)
Dept: GASTROENTEROLOGY | Facility: CLINIC | Age: 66
End: 2021-07-21

## 2021-07-21 NOTE — TELEPHONE ENCOUNTER
Writer reviewed pre-assessment questions with patient prior to upcoming colonoscopy on 7.29.2021.      Reviewed miralax and magnesium citrate prep instructions with patient.  Noting no nuts, seeds, or popcorn 7 days prior to procedure.     Designated  policy reviewed with patient.     Covid test scheduled: Pt was given COVID scheduling number [phone]; pt is aware COVID test needs to be within 4 days of procedure.    Arrival time: 0600    Facility location: [facility] [place]    Sedation type: CS    Pt verbalizes understanding.  Pt has no further questions or concerns.      Carrie Brian RN

## 2021-07-27 ENCOUNTER — LAB (OUTPATIENT)
Dept: LAB | Facility: CLINIC | Age: 66
End: 2021-07-27
Attending: STUDENT IN AN ORGANIZED HEALTH CARE EDUCATION/TRAINING PROGRAM
Payer: COMMERCIAL

## 2021-07-27 DIAGNOSIS — Z11.59 ENCOUNTER FOR SCREENING FOR OTHER VIRAL DISEASES: ICD-10-CM

## 2021-07-27 PROCEDURE — U0003 INFECTIOUS AGENT DETECTION BY NUCLEIC ACID (DNA OR RNA); SEVERE ACUTE RESPIRATORY SYNDROME CORONAVIRUS 2 (SARS-COV-2) (CORONAVIRUS DISEASE [COVID-19]), AMPLIFIED PROBE TECHNIQUE, MAKING USE OF HIGH THROUGHPUT TECHNOLOGIES AS DESCRIBED BY CMS-2020-01-R: HCPCS

## 2021-07-27 PROCEDURE — U0005 INFEC AGEN DETEC AMPLI PROBE: HCPCS

## 2021-07-28 ENCOUNTER — ANESTHESIA EVENT (OUTPATIENT)
Dept: SURGERY | Facility: AMBULATORY SURGERY CENTER | Age: 66
End: 2021-07-28
Payer: COMMERCIAL

## 2021-07-28 LAB — SARS-COV-2 RNA RESP QL NAA+PROBE: NEGATIVE

## 2021-07-29 ENCOUNTER — HOSPITAL ENCOUNTER (OUTPATIENT)
Facility: AMBULATORY SURGERY CENTER | Age: 66
End: 2021-07-29
Attending: STUDENT IN AN ORGANIZED HEALTH CARE EDUCATION/TRAINING PROGRAM
Payer: COMMERCIAL

## 2021-07-29 ENCOUNTER — ANESTHESIA (OUTPATIENT)
Dept: SURGERY | Facility: AMBULATORY SURGERY CENTER | Age: 66
End: 2021-07-29
Payer: COMMERCIAL

## 2021-07-29 VITALS
DIASTOLIC BLOOD PRESSURE: 80 MMHG | OXYGEN SATURATION: 95 % | TEMPERATURE: 97 F | SYSTOLIC BLOOD PRESSURE: 119 MMHG | BODY MASS INDEX: 27.77 KG/M2 | HEIGHT: 72 IN | RESPIRATION RATE: 18 BRPM | HEART RATE: 65 BPM | WEIGHT: 205 LBS

## 2021-07-29 VITALS — HEART RATE: 72 BPM

## 2021-07-29 LAB — COLONOSCOPY: NORMAL

## 2021-07-29 PROCEDURE — 45380 COLONOSCOPY AND BIOPSY: CPT | Mod: 33,59

## 2021-07-29 PROCEDURE — 45385 COLONOSCOPY W/LESION REMOVAL: CPT | Mod: 33

## 2021-07-29 PROCEDURE — 88305 TISSUE EXAM BY PATHOLOGIST: CPT | Performed by: PATHOLOGY

## 2021-07-29 RX ORDER — ONDANSETRON 2 MG/ML
4 INJECTION INTRAMUSCULAR; INTRAVENOUS
Status: DISCONTINUED | OUTPATIENT
Start: 2021-07-29 | End: 2021-07-30 | Stop reason: HOSPADM

## 2021-07-29 RX ORDER — PROPOFOL 10 MG/ML
INJECTION, EMULSION INTRAVENOUS PRN
Status: DISCONTINUED | OUTPATIENT
Start: 2021-07-29 | End: 2021-07-29

## 2021-07-29 RX ORDER — PROPOFOL 10 MG/ML
INJECTION, EMULSION INTRAVENOUS CONTINUOUS PRN
Status: DISCONTINUED | OUTPATIENT
Start: 2021-07-29 | End: 2021-07-29

## 2021-07-29 RX ORDER — LIDOCAINE HYDROCHLORIDE 20 MG/ML
INJECTION, SOLUTION INFILTRATION; PERINEURAL PRN
Status: DISCONTINUED | OUTPATIENT
Start: 2021-07-29 | End: 2021-07-29

## 2021-07-29 RX ORDER — NALOXONE HYDROCHLORIDE 0.4 MG/ML
0.2 INJECTION, SOLUTION INTRAMUSCULAR; INTRAVENOUS; SUBCUTANEOUS
Status: CANCELLED | OUTPATIENT
Start: 2021-07-29

## 2021-07-29 RX ORDER — NALOXONE HYDROCHLORIDE 0.4 MG/ML
0.4 INJECTION, SOLUTION INTRAMUSCULAR; INTRAVENOUS; SUBCUTANEOUS
Status: CANCELLED | OUTPATIENT
Start: 2021-07-29

## 2021-07-29 RX ORDER — LIDOCAINE 40 MG/G
CREAM TOPICAL
Status: DISCONTINUED | OUTPATIENT
Start: 2021-07-29 | End: 2021-07-30 | Stop reason: HOSPADM

## 2021-07-29 RX ORDER — FLUMAZENIL 0.1 MG/ML
0.2 INJECTION, SOLUTION INTRAVENOUS
Status: CANCELLED | OUTPATIENT
Start: 2021-07-29 | End: 2021-07-29

## 2021-07-29 RX ORDER — ONDANSETRON 2 MG/ML
4 INJECTION INTRAMUSCULAR; INTRAVENOUS EVERY 6 HOURS PRN
Status: CANCELLED | OUTPATIENT
Start: 2021-07-29

## 2021-07-29 RX ORDER — ONDANSETRON 4 MG/1
4 TABLET, ORALLY DISINTEGRATING ORAL EVERY 6 HOURS PRN
Status: CANCELLED | OUTPATIENT
Start: 2021-07-29

## 2021-07-29 RX ORDER — SODIUM CHLORIDE, SODIUM LACTATE, POTASSIUM CHLORIDE, CALCIUM CHLORIDE 600; 310; 30; 20 MG/100ML; MG/100ML; MG/100ML; MG/100ML
500 INJECTION, SOLUTION INTRAVENOUS CONTINUOUS
Status: DISCONTINUED | OUTPATIENT
Start: 2021-07-29 | End: 2021-07-30 | Stop reason: HOSPADM

## 2021-07-29 RX ORDER — PROCHLORPERAZINE MALEATE 10 MG
10 TABLET ORAL EVERY 6 HOURS PRN
Status: CANCELLED | OUTPATIENT
Start: 2021-07-29

## 2021-07-29 RX ORDER — SODIUM CHLORIDE, SODIUM LACTATE, POTASSIUM CHLORIDE, CALCIUM CHLORIDE 600; 310; 30; 20 MG/100ML; MG/100ML; MG/100ML; MG/100ML
INJECTION, SOLUTION INTRAVENOUS CONTINUOUS
Status: DISCONTINUED | OUTPATIENT
Start: 2021-07-29 | End: 2021-07-30 | Stop reason: HOSPADM

## 2021-07-29 RX ADMIN — PROPOFOL 30 MG: 10 INJECTION, EMULSION INTRAVENOUS at 07:29

## 2021-07-29 RX ADMIN — PROPOFOL 50 MG: 10 INJECTION, EMULSION INTRAVENOUS at 07:02

## 2021-07-29 RX ADMIN — PROPOFOL 150 MCG/KG/MIN: 10 INJECTION, EMULSION INTRAVENOUS at 07:02

## 2021-07-29 RX ADMIN — PROPOFOL 50 MG: 10 INJECTION, EMULSION INTRAVENOUS at 07:01

## 2021-07-29 RX ADMIN — SODIUM CHLORIDE, SODIUM LACTATE, POTASSIUM CHLORIDE, CALCIUM CHLORIDE: 600; 310; 30; 20 INJECTION, SOLUTION INTRAVENOUS at 06:57

## 2021-07-29 RX ADMIN — LIDOCAINE HYDROCHLORIDE 100 MG: 20 INJECTION, SOLUTION INFILTRATION; PERINEURAL at 06:59

## 2021-07-29 ASSESSMENT — MIFFLIN-ST. JEOR: SCORE: 1757.87

## 2021-07-29 NOTE — ANESTHESIA POSTPROCEDURE EVALUATION
Patient: Karlos Coleman    Procedure(s):  COLONOSCOPY, WITH POLYPECTOMY AND BIOPSY    Diagnosis:Screen for colon cancer [Z12.11]  Diagnosis Additional Information: No value filed.    Anesthesia Type:  MAC    Note:  Disposition: Outpatient   Postop Pain Control: Uneventful            Sign Out: Well controlled pain   PONV:    Neuro/Psych: Uneventful            Sign Out: Acceptable/Baseline neuro status   Airway/Respiratory: Uneventful            Sign Out: Acceptable/Baseline resp. status   CV/Hemodynamics: Uneventful            Sign Out: Acceptable CV status; No obvious hypovolemia; No obvious fluid overload   Other NRE:    DID A NON-ROUTINE EVENT OCCUR?            Last vitals:  Vitals Value Taken Time   /80 07/29/21 0748   Temp 36.1  C (97  F) 07/29/21 0748   Pulse 65 07/29/21 0748   Resp 18 07/29/21 0748   SpO2 95 % 07/29/21 0740       Electronically Signed By: Jorge Segura MD  July 29, 2021  12:50 PM

## 2021-07-29 NOTE — LETTER
"August 2, 2021      Karlos Coleman  [address] [facility] [address]        Dear ,    We are writing to inform you of your test results.    {results letter list:[id number]}    Resulted Orders   Surgical Pathology Exam   Result Value Ref Range    Case Report       Surgical Pathology Report                         Case: [id number]                                  Authorizing Provider:  Lesvia Duran MD          Collected:           07/29/2021 07:20 AM          Ordering Location:     [facility] OR  Received:            07/29/2021 08:21 AM                                 [place]                                                                  Pathologist:           Debbi Gonzalez MD                                                   Specimens:   A) - Other, Sigmoid Polyp                                                                           B) - Other, Rectal Polyps X 7                                                              Final Diagnosis       A.  Sigmoid polyp, polypectomy:  -Colonic mucosa with lymphoid aggregate  -Negative for neoplastic or hyperplastic polyp    B.  Rectal polyps x7, polypectomy:  -Multiple hyperplastic polyps        Clinical Information       Screen for colon cancer      Case Images      Gross Description       A. Other, Sigmoid Polyp:  The specimen is received in formalin with proper patient identification, labeled \"sigmoid polyp\".  The specimen consists of 3 pieces of tan-pink tissue ranging from 0.2 to 0.3 cm in greatest dimension, submitted in toto in cassette A1.     B. Other, Rectal Polyps X 7:  The specimen is received in formalin with proper patient identification, labeled \"rectal polyps x7 \".  The specimen consists of 7 pieces of tan-pink tissue ranging from 0.1 to 0.5 cm in greatest dimension, submitted in toto in cassette B1.           Microscopic Description       Microscopic examination is performed.         Performing Labs       The " technical component of this testing was completed at [facility]         If you have any questions or concerns, please call the clinic at the number listed above.       Sincerely,      Lesvia Duran MD

## 2021-07-29 NOTE — LETTER
August 2, 2021      Karlos Coleman  [address] [facility] [address]        Dear ,    We are writing to inform you of your test results.    All of the polyps removed during your colonoscopy were benign hyperplastic polyps. They are not precancerous polyps.     Given the finding of this type of polyp I recommend that you undergo a repeat colonoscopy in ten years.  However, a sooner examination might be necessary if you start developing any symptoms such as rectal bleeding, change in bowel habits, anemia, etc.  Please discuss this with your primary physician at the time of your next office appointment.  Your primary physician will schedule this repeat colonoscopy at the appropriate time.    It has been a pleasure to participate in your care.  Please call our clinic if you have any questions or concerns.          Resulted Orders   Surgical Pathology Exam   Result Value Ref Range    Case Report       Surgical Pathology Report                         Case: [id number]                                  Authorizing Provider:  Lesvia Duran MD          Collected:           07/29/2021 07:20 AM          Ordering Location:     [facility] OR  Received:            07/29/2021 08:21 AM                                 [place]                                                                  Pathologist:           Debbi Gonzalez MD                                                   Specimens:   A) - Other, Sigmoid Polyp                                                                           B) - Other, Rectal Polyps X 7                                                              Final Diagnosis       A.  Sigmoid polyp, polypectomy:  -Colonic mucosa with lymphoid aggregate  -Negative for neoplastic or hyperplastic polyp    B.  Rectal polyps x7, polypectomy:  -Multiple hyperplastic polyps        Clinical Information       Screen for colon cancer      Case Images      Gross Description        "A. Other, Sigmoid Polyp:  The specimen is received in formalin with proper patient identification, labeled \"sigmoid polyp\".  The specimen consists of 3 pieces of tan-pink tissue ranging from 0.2 to 0.3 cm in greatest dimension, submitted in toto in cassette A1.     B. Other, Rectal Polyps X 7:  The specimen is received in formalin with proper patient identification, labeled \"rectal polyps x7 \".  The specimen consists of 7 pieces of tan-pink tissue ranging from 0.1 to 0.5 cm in greatest dimension, submitted in toto in cassette B1.           Microscopic Description       Microscopic examination is performed.         Performing Labs       The technical component of this testing was completed at [facility]         If you have any questions or concerns, please call the clinic at the number listed above.       Sincerely,      Lesvia Duran MD          "

## 2021-07-29 NOTE — ANESTHESIA PREPROCEDURE EVALUATION
Anesthesia Pre-Procedure Evaluation    Patient: Karlos Coleman   MRN: [id number] : 1955        Preoperative Diagnosis: Screen for colon cancer [Z12.11]   Procedure : Procedure(s):  COLONOSCOPY     Past Medical History:   Diagnosis Date     Cancer of tongue (H)     s/p chemo&rad rx      Past Surgical History:   Procedure Laterality Date     ABDOMEN SURGERY      feeding tube plcmnt     ARTHROSCOPY KNEE WITH MEDIAL MENISCECTOMY Right 2016    Procedure: ARTHROSCOPY KNEE WITH MEDIAL MENISCECTOMY;  Surgeon: Jeromy Cohn MD;  Location: [facility] OR     C ORAL SURGERY PROCEDURE  12    extractions&root canals     DISSECTION RADICAL NECK MODIFIED  10/1/2012    Procedure: DISSECTION RADICAL NECK MODIFIED;;  Surgeon: Usha Rojas MD;  Location: [facility] OR     INSERT PORT VASCULAR ACCESS  2012    Procedure:INSERT PORT VASCULAR ACCESS; Right Chest Vascular Access Port Insertion, Percutaneous Gastrostomy Tube Insertion (c-arm); Surgeon:ANIBAL CUNNINGHAM; Location:[facility] OR     LARYNGOSCOPY, ESOPHAGOSCOPY,  BIOPSY, COMBINED  10/1/2012    Procedure: COMBINED LARYNGOSCOPY, ESOPHAGOSCOPY,  BIOPSY;  Direct Laryngoscopy, Left Modified Radical Neck Dissection   *Latex Safe*;  Surgeon: Usha Rojas MD;  Location: [facility] OR     LASER CO2 LARYNGOSCOPY  2012    Procedure:LASER CO2 LARYNGOSCOPY; Direct Laryngoscopy with Biopsies, Excision Base of Tongue Lesion CO2 Laser; Surgeon:USHA ROJAS; Location:[facility] OR     LASER CO2 LESION ORAL  2012    Procedure:LASER CO2 LESION ORAL; Surgeon:USHA ROJAS; Location:[facility] OR     REMOVE PORT VASCULAR ACCESS  2012    Procedure: REMOVE PORT VASCULAR ACCESS;  Right vascular access port removal and removal of foreign object in right middle digit.;  Surgeon: Shae Marte MD;  Location: [facility] OR      Allergies   Allergen Reactions     Vicodin [Hydrocodone-Acetaminophen] Other (See Comments)      Patient gets headache with Vicodin      Social History     Tobacco Use     Smoking status: Never Smoker     Smokeless tobacco: Never Used   Substance Use Topics     Alcohol use: No     Comment: none      Wt Readings from Last 1 Encounters:   08/13/20 91.3 kg (201 lb 3.2 oz)           Physical Exam    Airway        Mallampati: II   TM distance: > 3 FB   Neck ROM: full   Mouth opening: > 3 cm    Respiratory Devices and Support         Dental  no notable dental history         Cardiovascular   cardiovascular exam normal          Pulmonary   pulmonary exam normal                OUTSIDE LABS:  CBC:   Lab Results   Component Value Date    WBC 4.8 12/29/2016    WBC 4.2 03/28/2016    HGB 15.5 12/29/2016    HGB 15.1 03/28/2016    HCT 44.4 12/29/2016    HCT 44.2 03/28/2016     12/29/2016     03/28/2016     BMP:   Lab Results   Component Value Date     03/28/2016     (H) 10/29/2012    POTASSIUM 4.5 03/28/2016    POTASSIUM 4.0 10/29/2012    CHLORIDE 106 03/28/2016    CHLORIDE 106 10/29/2012    CO2 28 03/28/2016    CO2 28 10/29/2012    BUN 24 03/28/2016    BUN 13 10/29/2012    CR 1.00 03/28/2016    CR 0.89 10/29/2012    GLC 81 08/13/2020    GLC 91 03/28/2016     COAGS:   Lab Results   Component Value Date    INR 0.99 12/29/2016     POC:   Lab Results   Component Value Date    BGM 91 10/03/2012     HEPATIC:   Lab Results   Component Value Date    ALBUMIN 3.7 03/28/2016    PROTTOTAL 7.0 03/28/2016    ALT 30 03/28/2016    AST 15 03/28/2016    ALKPHOS 72 03/28/2016    BILITOTAL 0.4 03/28/2016     OTHER:   Lab Results   Component Value Date    LACT 1.0 06/07/2012    A1C 5.6 11/14/2017    DENIS 9.1 03/28/2016    MAG 2.0 08/15/2012    TSH 1.00 07/23/2012       Anesthesia Plan    ASA Status:  2   NPO Status:  NPO Appropriate    Anesthesia Type: MAC.     - Reason for MAC: straight local not clinically adequate   Induction: Intravenous, Propofol.   Maintenance: Balanced.        Consents    Anesthesia Plan(s) and  associated risks, benefits, and realistic alternatives discussed. Questions answered and patient/representative(s) expressed understanding.     - Discussed with:  Patient      - Extended Intubation/Ventilatory Support Discussed: No.      - Patient is DNR/DNI Status: No    Use of blood products discussed: No .     Postoperative Care    Pain management: Multi-modal analgesia.   PONV prophylaxis: Ondansetron (or other 5HT-3)     Comments:                Jorge Segura MD

## 2021-07-29 NOTE — ANESTHESIA CARE TRANSFER NOTE
Patient: Karlos Coleman    Procedure(s):  COLONOSCOPY, WITH POLYPECTOMY AND BIOPSY    Diagnosis: Screen for colon cancer [Z12.11]  Diagnosis Additional Information: No value filed.    Anesthesia Type:   MAC     Note:      Level of Consciousness: awake  Oxygen Supplementation: room air    Independent Airway: airway patency satisfactory and stable  Dentition: dentition unchanged  Vital Signs Stable: post-procedure vital signs reviewed and stable  Report to RN Given: handoff report given  Patient transferred to: Phase II    Handoff Report: Identifed the Patient, Identified the Reponsible Provider, Reviewed the pertinent medical history, Discussed the surgical course, Reviewed Intra-OP anesthesia mangement and issues during anesthesia, Set expectations for post-procedure period and Allowed opportunity for questions and acknowledgement of understanding      Vitals:  Vitals Value Taken Time   BP     Temp     Pulse 72 07/29/21 0733   Resp     SpO2         Electronically Signed By: ENMA Freed CRNA  July 29, 2021  7:38 AM

## 2021-07-29 NOTE — H&P
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)     Subjective tinnitus     Mass of left side of neck     Advanced directives, counseling/discussion     Benign prostate hyperplasia       Past Surgical History:    Past Surgical History:   Procedure Laterality Date     ABDOMEN SURGERY      feeding tube plcmnt     ARTHROSCOPY KNEE WITH MEDIAL MENISCECTOMY Right 12/29/2016    Procedure: ARTHROSCOPY KNEE WITH MEDIAL MENISCECTOMY;  Surgeon: Jeromy Cohn MD;  Location: [facility]     C ORAL SURGERY PROCEDURE  5/7/12    extractions&root canals     DISSECTION RADICAL NECK MODIFIED  10/1/2012    Procedure: DISSECTION RADICAL NECK MODIFIED;;  Surgeon: Usha Rojas MD;  [facility]     INSERT PORT VASCULAR ACCESS  5/17/2012    Procedure:INSERT PORT VASCULAR ACCESS; Right Chest Vascular Access Port Insertion, Percutaneous Gastrostomy Tube Insertion (c-arm); Surgeon:ANIBAL CUNNINGHAM; [facility]     LARYNGOSCOPY, ESOPHAGOSCOPY,  BIOPSY, COMBINED  10/1/2012    Procedure: COMBINED LARYNGOSCOPY, ESOPHAGOSCOPY,  BIOPSY;  Direct Laryngoscopy, Left Modified Radical Neck Dissection   *Latex Safe*;  Surgeon: Usha Rojas MD;  [facility]     LASER CO2 LARYNGOSCOPY  4/23/2012    Procedure:LASER CO2 LARYNGOSCOPY; Direct Laryngoscopy with Biopsies, Excision Base of Tongue Lesion CO2 Laser; Surgeon:USHA ROJAS; [facility]     LASER CO2 LESION ORAL  4/23/2012    Procedure:LASER CO2 LESION ORAL; Surgeon:USHA ROJAS; [facility]     REMOVE PORT VASCULAR ACCESS  12/12/2012    Procedure: REMOVE PORT VASCULAR ACCESS;  Right vascular access port removal and removal of foreign  object in right middle digit.;  Surgeon: Shae Marte MD;  [facility]       Past Medical History:   Past Medical History:   Diagnosis Date     Cancer of tongue (H)     s/p chemo&rad rx       Social History:   Social History     Tobacco Use     Smoking status: Never Smoker     Smokeless tobacco: Never Used   Substance Use Topics     Alcohol use: No     Comment: none       Family History:   Family History   Problem Relation Age of Onset     Diabetes Brother         aunt, grandmother     Allergies Mother      Obesity Brother      Psychotic Disorder Sister      Mental Illness Sister         bipolar     Cancer Father         prostate cancer     Diabetes Maternal Grandmother      Hearing Loss No family hx of        Allergies:   Allergies   Allergen Reactions     Vicodin [Hydrocodone-Acetaminophen] Other (See Comments)     Patient gets headache with Vicodin       Active Medications:   No current outpatient medications on file.       Systemic Review:   CONSTITUTIONAL: NEGATIVE for fever, chills, change in weight  ENT/MOUTH: NEGATIVE for ear, mouth and throat problems  RESP: NEGATIVE for significant cough or SOB  CV: NEGATIVE for chest pain, palpitations or peripheral edema    Physical Examination:   Vital Signs: /78   Pulse 74   Temp (!) 96  F (35.6  C)   Resp 18   Ht 1.829 m (6')   Wt 93 kg (205 lb)   SpO2 95%   BMI 27.80 kg/m    GENERAL: healthy, alert and no distress  NECK: no adenopathy, no asymmetry, masses, or scars  RESP: lungs clear to auscultation - no rales, rhonchi or wheezes  CV: regular rate and rhythm, normal S1 S2, no S3 or S4, no murmur, click or rub, no peripheral edema and peripheral pulses strong  ABDOMEN: soft, nontender, no hepatosplenomegaly, no masses and bowel sounds normal  MS: no gross musculoskeletal defects noted, no edema    Plan: Appropriate to proceed as scheduled.      Lesvia Duran MD  7/29/2021    PCP:  Drew Raines

## 2021-07-30 LAB
PATH REPORT.COMMENTS IMP SPEC: NORMAL
PATH REPORT.COMMENTS IMP SPEC: NORMAL
PATH REPORT.FINAL DX SPEC: NORMAL
PATH REPORT.GROSS SPEC: NORMAL
PATH REPORT.MICROSCOPIC SPEC OTHER STN: NORMAL
PATH REPORT.RELEVANT HX SPEC: NORMAL
PHOTO IMAGE: NORMAL

## 2021-09-11 ENCOUNTER — HEALTH MAINTENANCE LETTER (OUTPATIENT)
Age: 66
End: 2021-09-11

## 2021-11-06 ENCOUNTER — HEALTH MAINTENANCE LETTER (OUTPATIENT)
Age: 66
End: 2021-11-06

## 2021-11-29 ENCOUNTER — TELEPHONE (OUTPATIENT)
Dept: FAMILY MEDICINE | Facility: CLINIC | Age: 66
End: 2021-11-29
Payer: COMMERCIAL

## 2021-11-29 DIAGNOSIS — Z12.5 SCREENING FOR PROSTATE CANCER: Primary | ICD-10-CM

## 2021-11-29 DIAGNOSIS — R97.20 ELEVATED PROSTATE SPECIFIC ANTIGEN (PSA): ICD-10-CM

## 2021-11-29 NOTE — TELEPHONE ENCOUNTER
Patient called for an order to get his PSA checked please.        Franchesca White, GIANNI Hurley

## 2021-12-02 ENCOUNTER — IMMUNIZATION (OUTPATIENT)
Dept: NURSING | Facility: CLINIC | Age: 66
End: 2021-12-02
Payer: COMMERCIAL

## 2021-12-02 ENCOUNTER — LAB (OUTPATIENT)
Dept: LAB | Facility: CLINIC | Age: 66
End: 2021-12-02
Payer: COMMERCIAL

## 2021-12-02 DIAGNOSIS — Z13.220 SCREENING FOR HYPERLIPIDEMIA: ICD-10-CM

## 2021-12-02 DIAGNOSIS — Z12.5 SCREENING FOR PROSTATE CANCER: ICD-10-CM

## 2021-12-02 DIAGNOSIS — Z23 HIGH PRIORITY FOR 2019-NCOV VACCINE: Primary | ICD-10-CM

## 2021-12-02 LAB
CHOLEST SERPL-MCNC: 197 MG/DL
FASTING STATUS PATIENT QL REPORTED: YES
HDLC SERPL-MCNC: 50 MG/DL
LDLC SERPL CALC-MCNC: 131 MG/DL
NONHDLC SERPL-MCNC: 147 MG/DL
PSA SERPL-MCNC: 7.11 UG/L (ref 0–4)
TRIGL SERPL-MCNC: 81 MG/DL

## 2021-12-02 PROCEDURE — 80061 LIPID PANEL: CPT

## 2021-12-02 PROCEDURE — G0103 PSA SCREENING: HCPCS

## 2021-12-02 PROCEDURE — 36415 COLL VENOUS BLD VENIPUNCTURE: CPT

## 2021-12-02 PROCEDURE — 0064A COVID-19,PF,MODERNA (18+ YRS BOOSTER .25ML): CPT

## 2021-12-02 PROCEDURE — 91306 COVID-19,PF,MODERNA (18+ YRS BOOSTER .25ML): CPT

## 2021-12-02 PROCEDURE — 99207 PR NO CHARGE NURSE ONLY: CPT

## 2021-12-09 ENCOUNTER — TELEPHONE (OUTPATIENT)
Dept: UROLOGY | Facility: CLINIC | Age: 66
End: 2021-12-09

## 2021-12-09 ENCOUNTER — VIRTUAL VISIT (OUTPATIENT)
Dept: FAMILY MEDICINE | Facility: CLINIC | Age: 66
End: 2021-12-09
Payer: COMMERCIAL

## 2021-12-09 DIAGNOSIS — E78.00 ELEVATED CHOLESTEROL: ICD-10-CM

## 2021-12-09 DIAGNOSIS — C61 PROSTATE CANCER (H): Primary | ICD-10-CM

## 2021-12-09 DIAGNOSIS — Z13.6 CARDIOVASCULAR SCREENING; LDL GOAL LESS THAN 160: ICD-10-CM

## 2021-12-09 PROCEDURE — 99213 OFFICE O/P EST LOW 20 MIN: CPT | Mod: 95 | Performed by: FAMILY MEDICINE

## 2021-12-09 ASSESSMENT — PAIN SCALES - GENERAL: PAINLEVEL: NO PAIN (0)

## 2021-12-09 NOTE — PROGRESS NOTES
Karlos is a 66 year old who is being evaluated via a billable telephone visit.      What phone number would you like to be contacted at? [phone]  How would you like to obtain your AVS? Ángel    Assessment & Plan       [facility]  He has positive biosy for adenocarcinoma and has been in a wait and see approach. Now with increasing psa I do recommend reestablishing with a new urologists.,  I recommended Dr Champion who I feel would be a good fit for him.  In the mean time I have ask for him to get updated records form his urologists     (C61) Prostate cancer (H)  (primary encounter diagnosis)  With slowly increasing psa recommend finding new urologists.     (Z13.6) CARDIOVASCULAR SCREENING; LDL GOAL LESS THAN 160      (E78.00) Elevated cholesterol  discused risk is over 10.  He has no family h/o cad, or cva.  He woud like to work on diet and exercise.,  Discussed if ld goes up should be on statin,  If goes down  coint with diet and exercise if stable consider coronary ct calcium score to further risk stratify as he is reluctant to start medications.         The 10-year ASCVD risk score (Vasquez NICOLE Jr., et al., 2013) is: 10.3%    Values used to calculate the score:      Age: 66 years      Sex: Male      Is Non- : No      Diabetic: No      Tobacco smoker: No      Systolic Blood Pressure: 119 mmHg      Is BP treated: No      HDL Cholesterol: 50 mg/dL      Total Cholesterol: 197 mg/dL      Please fax results to his urologist      [facility]    [address] [place] [address]    [phone]         BMI:   Estimated body mass index is 27.8 kg/m  as calculated from the following:    Height as of 7/29/21: 1.829 m (6').    Weight as of 7/29/21: 93 kg (205 lb).   Weight management plan: Discussed healthy diet and exercise guidelines      Drew Raines MD  [facility]    Ashvin Everett is a 66 year old who  presents for the following health issues     HPI      Chief Complaint   Patient presents with     Results     -He is waning to follow up on his test results from 12/02/2021.    Component      Latest Ref Rng & Units 12/2/2021   Cholesterol      <200 mg/dL 197   Triglycerides      <150 mg/dL 81   HDL Cholesterol      >=40 mg/dL 50   LDL Cholesterol Calculated      <=100 mg/dL 131 (H)   Non HDL Cholesterol      <130 mg/dL 147 (H)   Patient Fasting > 8hrs?       Yes   PSA      0.00 - 4.00 ug/L 7.11 (H)       Review of Systems   Constitutional, HEENT, cardiovascular, pulmonary, gi and gu systems are negative, except as otherwise noted.      Objective           Vitals:  No vitals were obtained today due to virtual visit.    Physical Exam   healthy, alert and no distress  PSYCH: Alert and oriented times 3; coherent speech, normal   rate and volume, able to articulate logical thoughts, able   to abstract reason, no tangential thoughts, no hallucinations   or delusions  His affect is normal  RESP: No cough, no audible wheezing, able to talk in full sentences  Remainder of exam unable to be completed due to telephone visits          Phone call duration: 8 minutes

## 2021-12-09 NOTE — TELEPHONE ENCOUNTER
Reason for Call:  Other call back    Detailed comments: Pt states he has a question about upcoming appt.-does pt need contrast IV before appt. pcp suggested to have it done, but wasn't sure if it should be done before upcoming appt. with Dr. Champion. Pt states his PSA was at 6.04 last year and now just last week it was a 7.11.  Pt states he had biopsy 4 years ago-information should be in our system.     Phone Number Patient can be reached at: Cell number on file:    Telephone Information:   Mobile [phone]       Best Time: any    Can we leave a detailed message on this number? YES    Call taken on 12/9/2021 at 11:19 AM by Helen Lora

## 2021-12-09 NOTE — TELEPHONE ENCOUNTER
Spoke with patient who saw his PCP and an MRI was ordered for prostate and elevated PSA. Pt stated he was told that Urologist could decide if pt should get MRI or not. Please review chart and advise if pt should have MRI and that correct one is ordered. Thank you.  Rosa ZAPATA RN BSN PHN  [facility]

## 2022-01-06 ENCOUNTER — HOSPITAL ENCOUNTER (OUTPATIENT)
Dept: MRI IMAGING | Facility: CLINIC | Age: 67
Discharge: HOME | End: 2022-01-06
Attending: FAMILY MEDICINE | Admitting: FAMILY MEDICINE
Payer: COMMERCIAL

## 2022-01-06 DIAGNOSIS — R97.20 ELEVATED PROSTATE SPECIFIC ANTIGEN (PSA): ICD-10-CM

## 2022-01-06 PROCEDURE — A9585 GADOBUTROL INJECTION: HCPCS | Performed by: STUDENT IN AN ORGANIZED HEALTH CARE EDUCATION/TRAINING PROGRAM

## 2022-01-06 PROCEDURE — 72197 MRI PELVIS W/O & W/DYE: CPT

## 2022-01-06 PROCEDURE — 255N000002 HC RX 255 OP 636: Performed by: STUDENT IN AN ORGANIZED HEALTH CARE EDUCATION/TRAINING PROGRAM

## 2022-01-06 PROCEDURE — 72195 MRI PELVIS W/O DYE: CPT | Mod: 26 | Performed by: RADIOLOGY

## 2022-01-06 RX ORDER — GADOBUTROL 604.72 MG/ML
10 INJECTION INTRAVENOUS ONCE
Status: COMPLETED | OUTPATIENT
Start: 2022-01-06 | End: 2022-01-06

## 2022-01-06 RX ADMIN — GADOBUTROL 10 ML: 604.72 INJECTION INTRAVENOUS at 09:36

## 2022-02-03 ENCOUNTER — OFFICE VISIT (OUTPATIENT)
Dept: UROLOGY | Facility: CLINIC | Age: 67
End: 2022-02-03
Payer: COMMERCIAL

## 2022-02-03 VITALS — RESPIRATION RATE: 16 BRPM | SYSTOLIC BLOOD PRESSURE: 129 MMHG | HEART RATE: 62 BPM | DIASTOLIC BLOOD PRESSURE: 79 MMHG

## 2022-02-03 DIAGNOSIS — C61 PROSTATE CANCER (H): Primary | ICD-10-CM

## 2022-02-03 DIAGNOSIS — R97.20 ELEVATED PROSTATE SPECIFIC ANTIGEN (PSA): ICD-10-CM

## 2022-02-03 PROCEDURE — 99204 OFFICE O/P NEW MOD 45 MIN: CPT | Performed by: UROLOGY

## 2022-02-03 NOTE — PROGRESS NOTES
Chief Complaint:   Elevated PSA         History of Present Illness:   Karlos Coleman is a 66 year old male with a history of oropharyngeal cancer s/p chemotherapy and radiation, BPH, and [place] 3+3= 6 prostate cancer who presents for evaluation of elevated PSA. His only prostate biopsy was at [facility] on 11/30/2017 PSA showing Amanda 3+3= 6 in the left lateral base with 40% tissue involvement. At that time was 5.1.     He has been following with Dr. Raines, his PCP, for PSA checks. On 12/02/2021, PSA was 7.11, increased from 6.04 on 8/13/2020. A prostate MRI was obtained with showed a 102 g prostate, PI-RADS 2 with no suspicious nodules. Prostate density is 0.07.     He believes his father had either prostate cancer or colon cancer, but is unsure of which. He denies family history of other  malignancy. He has never had gross hematuria. He does note double voiding, but this is not bothersome to him. He takes no medications and is not interested in treatment of urinary symptoms at this time. He denies other urinary symptoms.     PSA results:  12/02/2021: 7.11  8/13/2020: 6.04  12/03/2018: 4.3  6/21/2018: 4.9  11/28/2017: 5.1  11/14/2017: 4.2  10/1/2014: 3.32  11/02/2011: 2.01  8/1/2007: 1.51           Past Medical History:     Past Medical History:   Diagnosis Date     Cancer of tongue (H)     s/p chemo&rad rx            Past Surgical History:     Past Surgical History:   Procedure Laterality Date     ABDOMEN SURGERY      feeding tube plcmnt     ARTHROSCOPY KNEE WITH MEDIAL MENISCECTOMY Right 12/29/2016    Procedure: ARTHROSCOPY KNEE WITH MEDIAL MENISCECTOMY;  Surgeon: Jeromy Cohn MD;  Location: [facility]     COLONOSCOPY N/A 7/29/2021    Procedure: COLONOSCOPY, WITH POLYPECTOMY AND BIOPSY;  Surgeon: Lesvia Duran MD;  [place] [facility] [place]     DISSECTION RADICAL NECK MODIFIED  10/1/2012    Procedure: DISSECTION RADICAL NECK MODIFIED;;  Surgeon: Usha Rojas MD;  Location:   OR     INSERT PORT VASCULAR ACCESS  5/17/2012    Procedure:INSERT PORT VASCULAR ACCESS; Right Chest Vascular Access Port Insertion, Percutaneous Gastrostomy Tube Insertion (c-arm); Surgeon:ANIBAL CUNNINGHAM; [facility]     LARYNGOSCOPY, ESOPHAGOSCOPY,  BIOPSY, COMBINED  10/1/2012    Procedure: COMBINED LARYNGOSCOPY, ESOPHAGOSCOPY,  BIOPSY;  Direct Laryngoscopy, Left Modified Radical Neck Dissection   *Latex Safe*;  Surgeon: Latisha Rojas MD;  [facility]     LASER CO2 LARYNGOSCOPY  4/23/2012    Procedure:LASER CO2 LARYNGOSCOPY; Direct Laryngoscopy with Biopsies, Excision Base of Tongue Lesion CO2 Laser; Surgeon:LATISHA ROJAS; [facility]     LASER CO2 LESION ORAL  4/23/2012    Procedure:LASER CO2 LESION ORAL; Surgeon:LATISHA ROJAS; [facility]     REMOVE PORT VASCULAR ACCESS  12/12/2012    Procedure: REMOVE PORT VASCULAR ACCESS;  Right vascular access port removal and removal of foreign object in right middle digit.;  Surgeon: Shae Marte MD;  Location:  OR     [facility] ORAL SURGERY PROCEDURE  5/7/12    extractions&root canals            Medications     No current outpatient medications on file.          Allergies:   Vicodin [hydrocodone-acetaminophen]         Review of Systems:  From intake questionnaire   Negative 14 system review except as noted on HPI, nurse's note.         Physical Exam:   Patient is a 66 year old  male   Vitals: Blood pressure 129/79, pulse 62, resp. rate 16.  General Appearance Adult: Alert, no acute distress, oriented.  Lungs: Non-labored breathing.  Heart: No obvious jugular venous distension present.  Skin: no suspicious lesions or rashes  Neuro: Alert, oriented, speech and mentation normal      Labs and Pathology:    I personally reviewed all applicable laboratory data and went over findings with patient  Significant for:    PSA RESULTS  PSA   Date Value Ref Range Status   08/13/2020 6.04 (H) 0 - 4 ug/L  Final     Comment:     Assay Method:  Chemiluminescence using Siemens Vista analyzer   10/01/2014 3.32 0 - 4 ug/L Final     Prostate Specific Antigen Screen   Date Value Ref Range Status   12/02/2021 7.11 (H) 0.00 - 4.00 ug/L Final         Imaging:    I personally reviewed all applicable imaging and went over findings with patient.  Significant for:    Results for orders placed or performed during the hospital encounter of 01/06/22   MR Prostate  wo Contrast    Narrative    MRI PROSTATE: 1/6/2022 10:04 AM    CLINICAL HISTORY: Elevated prostate specific antigen (PSA)    Most Recent PSA: 7.11 ug/L on 12/2/2021    Comparison: None.    TECHNIQUE:  The following sequences were obtained: High-resolution axial  T2-weighted, coronal T2-weighted, 3D volumetric T2-weighted, axial  pre-contrast T1, axial diffusion-weighted, axial apparent diffusion  coefficient and axial dynamic contrast-enhanced T1. Postcontrast  images were evaluated on a separate workstation to evaluate dynamic  contrast enhancement. The technique of this exam is PI-RADS v2.1  compliant. Contrast dose: 10cc of Gadavist injected.    FINDINGS:  Size: 102 grams  Hemorrhage: Absent  Peripheral zone: Heterogeneous on T2-weighted images. Regions of  mildly decreased signal on ADC or DWI which are best characterized as  PI-RADS 2 without highly suspicious lesion.  Transition zone: Enlarged with BPH changes. Transition zone nodules  which are circumscribed or mostly encapsulated without diffusion  restriction.  PI-RADS 2.  No highly suspicious nodules.    Neurovascular bundles: No neurovascular bundle involvement by  malignancy.  Seminal vesicles: No seminal vesicle involvement by malignancy.  Lymph nodes: No lymph node involvement  Bones: No suspicious lesions  Other pelvic organs: No additional findings.        Impression    IMPRESSION:  1. Based on the most suspicious abnormality, this exam is  characterized as PIRADS 2 - Clinically significant cancer is  unlikely  to be present.    2. No suspicious adenopathy or evidence of pelvic metastases.      PIRADS? v2.1 Assessment Categories   PIRADS 1: Very low (clinically significant cancer is highly unlikely  to be present)   PIRADS 2: Low (clinically significant cancer is unlikely to be  present)   PIRADS 3: Intermediate (the presence of clinically significant cancer  is equivocal)   PIRADS 4: High (clinically significant cancer is likely to be present)    PIRADS 5: Very high (clinically significant cancer is highly likely to  be present)          I have personally reviewed the examination and initial interpretation  and I agree with the findings.    JAKE LIANG MD         SYSTEM ID:  [id number]          Assessment and Plan:     Assessment: 66 year old male with oropharyngeal cancer s/p chemotherapy and radiation, BPH, and [place] 3+3= 6  prostate cancer who presents for evaluation of elevated PSA. His only prostate biopsy was at [facility] on 11/30/2017 PSA showing Amanda 3+3= 6 in the left lateral base with 40% tissue involvement. At that time was 5.1.    PSA has increased from 6.04 to 7.11 in the last year. A prostate MRI was obtained with showed a 102 g prostate, PI-RADS 2 with no suspicious nodules. Prostate density is 0.07.     Plan: Since the patient has not had a prostate biopsy in 4 years, it is appropriate to repeat a biopsy per active surveillance guidelines. This will be scheduled in the next few weeks with follow up to discuss biopsy results and the plan moving forward.     NENA CORTEZ PA-C  Department of Urology

## 2022-02-03 NOTE — NURSING NOTE
Initial /79 (BP Location: Left arm, Patient Position: Chair, Cuff Size: Adult Regular)   Pulse 62   Resp 16  Estimated body mass index is 27.8 kg/m  as calculated from the following:    Height as of 7/29/21: 1.829 m (6').    Weight as of 7/29/21: 93 kg (205 lb). .    Patient is here for a consult for elevated psa and mri results.  gume medley LPN

## 2022-02-08 ENCOUNTER — TELEPHONE (OUTPATIENT)
Dept: UROLOGY | Facility: CLINIC | Age: 67
End: 2022-02-08
Payer: COMMERCIAL

## 2022-04-15 ENCOUNTER — NURSE TRIAGE (OUTPATIENT)
Dept: NURSING | Facility: CLINIC | Age: 67
End: 2022-04-15
Payer: COMMERCIAL

## 2022-05-27 ENCOUNTER — TELEPHONE (OUTPATIENT)
Dept: UROLOGY | Facility: CLINIC | Age: 67
End: 2022-05-27
Payer: COMMERCIAL

## 2022-05-27 NOTE — TELEPHONE ENCOUNTER
Contacted patient to discuss questions. Patient is wondering if he can get an RX for sildenafil. Will message Dr. Champion. Patient stated understanding.  Maureen Christensen LPN  Urology Clinic Service   [facility]

## 2022-05-27 NOTE — TELEPHONE ENCOUNTER
[facility] Call Center    Phone Message    May a detailed message be left on voicemail: yes     Reason for Call: Other: Pt called to schedule prostate biopsy for 8/8/22.  Pt has questions regarding this.  Please give pt a call back to discuss.  Thank you     Action Taken: Message routed to:  [facility] U[facility]    Travel Screening: Not Applicable                                                                         Attending Attestation (For Attendings USE Only)...

## 2022-06-06 ENCOUNTER — MYC MEDICAL ADVICE (OUTPATIENT)
Dept: UROLOGY | Facility: CLINIC | Age: 67
End: 2022-06-06
Payer: COMMERCIAL

## 2022-06-07 DIAGNOSIS — N52.31 ERECTILE DYSFUNCTION AFTER RADICAL PROSTATECTOMY: Primary | ICD-10-CM

## 2022-06-07 RX ORDER — SILDENAFIL CITRATE 20 MG/1
TABLET ORAL
Qty: 90 TABLET | Refills: 11 | Status: SHIPPED | OUTPATIENT
Start: 2022-06-07

## 2022-06-12 ENCOUNTER — HEALTH MAINTENANCE LETTER (OUTPATIENT)
Age: 67
End: 2022-06-12

## 2022-07-03 ENCOUNTER — TELEPHONE (OUTPATIENT)
Dept: FAMILY MEDICINE | Facility: CLINIC | Age: 67
End: 2022-07-03

## 2022-07-03 ENCOUNTER — NURSE TRIAGE (OUTPATIENT)
Dept: NURSING | Facility: CLINIC | Age: 67
End: 2022-07-03

## 2022-07-03 NOTE — TELEPHONE ENCOUNTER
Reason for call:  Other   Patient called regarding (reason for call): appointment  Additional comments: triaged with FNA: bulging surrounding hernia that was repaired a few years ago, should be seen within the week. PCP preferred but willing to see any available provider    Phone number to reach patient:  Cell number on file:    Telephone Information:   Mobile [phone]       Best Time:  any    Can we leave a detailed message on this number?  YES    Travel screening: Negative

## 2022-07-03 NOTE — TELEPHONE ENCOUNTER
Patient had a hernia 7-8 years ago and noticed this morning that it was bulging.    Located in lower left abdomen. Does not reduce when pushed in. Patient states it is firm and non-tender.    Patient denies any nausea or abdominal pain. No fever.    Per protocol, recommendations are for routine office f/u appointment.  Care advice given. Advised patient to call back if they develop any new or worsening symptoms. Patient verbalizes understanding and agrees with plan of care.    Samantha Hansen RN  07/03/22 8:36 AM  [facility] Nurse Advisor      Reason for Disposition    Can't reduce the hernia (NO pain, local tenderness, or vomiting)    Additional Information    Negative: [1] Swelling of scrotum AND [2] has not previously been diagnosed with a hernia    Negative: SEVERE abdominal pain    Negative: Hernia is painful or tender to touch    Negative: [1] Vomiting AND [2] can't reduce the hernia    Negative: [1] Vomiting AND [2] abdomen looks much more swollen than usual    Negative: [1] Swollen lump in groin AND [2] pulsating (like heartbeat)    Negative: Patient sounds very sick or weak to the triager    Negative: [1] Constant abdominal pain AND [2] present > 2 hours  (NO pain or tenderness of hernia)    Protocols used: HERNIA-A-

## 2022-07-05 NOTE — TELEPHONE ENCOUNTER
Spoke to pt and scheduled with a provider in [place]   Was A Bandage Applied: Yes Render In Bullet Format When Appropriate: No Silver Nitrate Text: The wound bed was treated with silver nitrate after the biopsy was performed. Curettage Text: The wound bed was treated with curettage after the biopsy was performed. Biopsy Method: double edge Personna blade Hemostasis: Aluminum Chloride Billing Type: Third-Party Bill Post-Care Instructions: I reviewed with the patient in detail post-care instructions. Wash site twice daily with gentle soap and water and apply vaseline and bandage. Detail Level: Detailed Lab: 6 Lab Facility: 3 Size Of Lesion In Cm: 1.7 Anesthesia Type: 1% lidocaine with epinephrine Cryotherapy Text: The wound bed was treated with cryotherapy after the biopsy was performed. Additional Anesthesia Volume In Cc (Will Not Render If 0): 0 Wound Care: Petrolatum Notification Instructions: Patient will be notified of biopsy results. However, patient instructed to call the office if not contacted within 2 weeks. Biopsy Type: H and E Consent: Written consent was obtained and risks were reviewed including but not limited to scarring, infection, bleeding, scabbing, incomplete removal, nerve damage and allergy to anesthesia. Electrodesiccation Text: The wound bed was treated with electrodesiccation after the biopsy was performed. Depth Of Biopsy: dermis Electrodesiccation And Curettage Text: The wound bed was treated with electrodesiccation and curettage after the biopsy was performed. Type Of Destruction Used: Curettage Dressing: bandage Anesthesia Volume In Cc (Will Not Render If 0): 0.5

## 2022-07-12 ENCOUNTER — OFFICE VISIT (OUTPATIENT)
Dept: SURGERY | Facility: CLINIC | Age: 67
End: 2022-07-12
Payer: COMMERCIAL

## 2022-07-12 VITALS
WEIGHT: 193.8 LBS | HEART RATE: 66 BPM | BODY MASS INDEX: 26.25 KG/M2 | HEIGHT: 72 IN | DIASTOLIC BLOOD PRESSURE: 75 MMHG | SYSTOLIC BLOOD PRESSURE: 125 MMHG | TEMPERATURE: 97.8 F

## 2022-07-12 DIAGNOSIS — R10.32 INGUINODYNIA, LEFT: Primary | ICD-10-CM

## 2022-07-12 PROCEDURE — 99203 OFFICE O/P NEW LOW 30 MIN: CPT | Performed by: SURGERY

## 2022-07-12 NOTE — NURSING NOTE
Initial /75 (BP Location: Right arm, Patient Position: Sitting, Cuff Size: Adult Large)   Pulse 66   Temp 97.8  F (36.6  C) (Tympanic)   Ht 1.829 m (6')   Wt 87.9 kg (193 lb 12.8 oz)   BMI 26.28 kg/m   Estimated body mass index is 26.28 kg/m  as calculated from the following:    Height as of this encounter: 1.829 m (6').    Weight as of this encounter: 87.9 kg (193 lb 12.8 oz). .    Marianne Cline MA

## 2022-07-12 NOTE — LETTER
7/12/2022         RE: Karlos Coleman  [address] [facility] [address]        Dear Colleague,    Thank you for referring your patient, Karlos Coleman, to the [facility]. Please see a copy of my visit note below.    Surgical Consultation/History and Physical  [facility] Surgery    Karlos is seen in consultation for hernia, at the request of Drew Raines MD.    Chief Complaint:  Hernia    History of Present Illness: Karlos Coleman is a 67 year old male presents with hernia.  He admits an area of discomfort in left groin for past 1 week.  It causes mild discomfort.  It woke him from sleep.  Denies nausea or vomiting.  He has a history of open left inguinal hernia repair 6-8 years ago.      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)     Subjective tinnitus     Mass of left side of neck     Advanced directives, counseling/discussion     Benign prostate hyperplasia     Past Medical History:   Diagnosis Date     Cancer of tongue (H)     s/p chemo&rad rx     Past Surgical History:   Procedure Laterality Date     ABDOMEN SURGERY      feeding tube plcmnt     ARTHROSCOPY KNEE WITH MEDIAL MENISCECTOMY Right 12/29/2016    Procedure: ARTHROSCOPY KNEE WITH MEDIAL MENISCECTOMY;  Surgeon: Jeromy Cohn MD;  Location: [facility] OR     COLONOSCOPY N/A 7/29/2021    Procedure: COLONOSCOPY, WITH POLYPECTOMY AND BIOPSY;  Surgeon: Lesvia Duran MD;  Location: [facility] OR     DISSECTION RADICAL NECK MODIFIED  10/1/2012    Procedure: DISSECTION RADICAL NECK MODIFIED;;  Surgeon: Usha Rojas MD;  Location:  OR     INSERT PORT VASCULAR ACCESS  5/17/2012    Procedure:INSERT PORT VASCULAR ACCESS; Right Chest Vascular Access Port Insertion, Percutaneous Gastrostomy Tube Insertion (c-arm); Surgeon:OCTAVIO  ANIBAL COLVIN; Location:[facility] OR     LARYNGOSCOPY, ESOPHAGOSCOPY,  BIOPSY, COMBINED  10/1/2012    Procedure: COMBINED LARYNGOSCOPY, ESOPHAGOSCOPY,  BIOPSY;  Direct Laryngoscopy, Left Modified Radical Neck Dissection   *Latex Safe*;  Surgeon: Latisha Rojas MD;  Location: [facility] OR     LASER CO2 LARYNGOSCOPY  4/23/2012    Procedure:LASER CO2 LARYNGOSCOPY; Direct Laryngoscopy with Biopsies, Excision Base of Tongue Lesion CO2 Laser; Surgeon:LATISHA ROJAS; [facility] OR     LASER CO2 LESION ORAL  4/23/2012    Procedure:LASER CO2 LESION ORAL; Surgeon:LATISHA ROJAS; [facility]     REMOVE PORT VASCULAR ACCESS  12/12/2012    Procedure: REMOVE PORT VASCULAR ACCESS;  Right vascular access port removal and removal of foreign object in right middle digit.;  Surgeon: Shae Marte MD;  Location: [facility] OR     ZZC ORAL SURGERY PROCEDURE  5/7/12    extractions&root canals     Family History   Problem Relation Age of Onset     Diabetes Brother         aunt, grandmother     Allergies Mother      Obesity Brother      Psychotic Disorder Sister      Mental Illness Sister         bipolar     Cancer Father         prostate cancer     Diabetes Maternal Grandmother      Hearing Loss No family hx of      Social History     Tobacco Use     Smoking status: Never Smoker     Smokeless tobacco: Never Used   Substance Use Topics     Alcohol use: No     Comment: none      History   Drug Use No     Current Outpatient Medications   Medication Sig Dispense Refill     sildenafil (REVATIO) 20 MG tablet Take 1-5 tablets as needed for sexual activity 90 tablet 11     Allergies   Allergen Reactions     Vicodin [Hydrocodone-Acetaminophen] Other (See Comments)     Patient gets headache with Vicodin     Review of Systems:   10 point ROS otherwise negative    Physical Exam:  /75 (BP Location: Right arm, Patient Position: Sitting, Cuff Size: Adult Large)   Pulse 66   Temp 97.8  F (36.6  C)  (Tympanic)   Ht 1.829 m (6')   Wt 87.9 kg (193 lb 12.8 oz)   BMI 26.28 kg/m      Constitutional- No acute distress, well nourished, non-toxic  Eyes: Anicteric, no injection.  PERRL  ENT:  Normocephalic, atraumatic, Nose midline, moist mucus membranes  Neck - supple, no LAD  Respiratory- Clear to auscultation bilaterally, good inspiratory effort  Cardiovascular - Heart RRR, no lift's, thrills, murmurs, rubs, or gallop.  No peripheral edema.  No clubbing.  Abdomen - Soft, non-tender, +BS, no hepatosplenomegaly, no palpable masses  Groins - 2+ pulses bilaterally and no LAD, edge of mesh palpable left groin, well healed incision.  No bulge with valsalva.  No right inguinal hernia  Neuro - No focal neuro deficits, Alert and oriented x 3  Psych: Appropriate mood and affect  Musculoskeletal: Normal gait, symmetric strength.  FROM upper and lower extremities.  Skin: Warm, Dry    Assessment:  1. Inguinodynia, left      Plan:   Mr. Coleman presents with concerns of recurrent left inguinal hernia.  On exam, he has no evidence of hernia.  He has mild to minimal discomfort.  I discussed options of watchful waiting vs CT scan to rule out occult hernia.  He is comfortable with watchful waiting.  I discussed conservative management of groin strain including rest, ice, OTC Motrin/Tylenol.  Krames hernia guide reviewed with patient.  Signs/symptoms of incarceration/strangulation were reviewed.  All questions answered to the best of my ability.        Charles Vazquez DO on 7/12/2022 at 11:28 AM        Again, thank you for allowing me to participate in the care of your patient.        Sincerely,        Charles Vazquez DO

## 2022-07-12 NOTE — PROGRESS NOTES
Surgical Consultation/History and Physical  [facility] Surgery    Karlos is seen in consultation for hernia, at the request of Drew Raines MD.    Chief Complaint:  Hernia    History of Present Illness: Karlos Coleman is a 67 year old male presents with hernia.  He admits an area of discomfort in left groin for past 1 week.  It causes mild discomfort.  It woke him from sleep.  Denies nausea or vomiting.  He has a history of open left inguinal hernia repair 6-8 years ago.      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)     Subjective tinnitus     Mass of left side of neck     Advanced directives, counseling/discussion     Benign prostate hyperplasia     Past Medical History:   Diagnosis Date     Cancer of tongue (H)     s/p chemo&rad rx     Past Surgical History:   Procedure Laterality Date     ABDOMEN SURGERY      feeding tube plcmnt     ARTHROSCOPY KNEE WITH MEDIAL MENISCECTOMY Right 12/29/2016    Procedure: ARTHROSCOPY KNEE WITH MEDIAL MENISCECTOMY;  Surgeon: Jeromy Cohn MD;  Location: [facility] OR     COLONOSCOPY N/A 7/29/2021    Procedure: COLONOSCOPY, WITH POLYPECTOMY AND BIOPSY;  Surgeon: Lesvia Duran MD;  Location: [facility] OR     DISSECTION RADICAL NECK MODIFIED  10/1/2012    Procedure: DISSECTION RADICAL NECK MODIFIED;;  Surgeon: Usha Rojas MD;  [facility]: [facility] OR     INSERT PORT VASCULAR ACCESS  5/17/2012    Procedure:INSERT PORT VASCULAR ACCESS; Right Chest Vascular Access Port Insertion, Percutaneous Gastrostomy Tube Insertion (c-arm); Surgeon:ANIBAL CUNNINGHAM; [facility] OR     LARYNGOSCOPY, ESOPHAGOSCOPY,  BIOPSY, COMBINED  10/1/2012    Procedure: COMBINED LARYNGOSCOPY, ESOPHAGOSCOPY,  BIOPSY;  Direct Laryngoscopy, Left Modified Radical Neck Dissection   *Latex Safe*;  Surgeon:  Latisha Rojas MD;  [facility]     LASER CO2 LARYNGOSCOPY  4/23/2012    Procedure:LASER CO2 LARYNGOSCOPY; Direct Laryngoscopy with Biopsies, Excision Base of Tongue Lesion CO2 Laser; Surgeon:LATISHA ROJAS; [facility]     LASER CO2 LESION ORAL  4/23/2012    Procedure:LASER CO2 LESION ORAL; Surgeon:LATISHA ROJAS; [facility]     REMOVE PORT VASCULAR ACCESS  12/12/2012    Procedure: REMOVE PORT VASCULAR ACCESS;  Right vascular access port removal and removal of foreign object in right middle digit.;  Surgeon: Shae Marte MD;  Location: [facility] OR     ZZC ORAL SURGERY PROCEDURE  5/7/12    extractions&root canals     Family History   Problem Relation Age of Onset     Diabetes Brother         aunt, grandmother     Allergies Mother      Obesity Brother      Psychotic Disorder Sister      Mental Illness Sister         bipolar     Cancer Father         prostate cancer     Diabetes Maternal Grandmother      Hearing Loss No family hx of      Social History     Tobacco Use     Smoking status: Never Smoker     Smokeless tobacco: Never Used   Substance Use Topics     Alcohol use: No     Comment: none      History   Drug Use No     Current Outpatient Medications   Medication Sig Dispense Refill     sildenafil (REVATIO) 20 MG tablet Take 1-5 tablets as needed for sexual activity 90 tablet 11     Allergies   Allergen Reactions     Vicodin [Hydrocodone-Acetaminophen] Other (See Comments)     Patient gets headache with Vicodin     Review of Systems:   10 point ROS otherwise negative    Physical Exam:  /75 (BP Location: Right arm, Patient Position: Sitting, Cuff Size: Adult Large)   Pulse 66   Temp 97.8  F (36.6  C) (Tympanic)   Ht 1.829 m (6')   Wt 87.9 kg (193 lb 12.8 oz)   BMI 26.28 kg/m      Constitutional- No acute distress, well nourished, non-toxic  Eyes: Anicteric, no injection.  PERRL  ENT:  Normocephalic, atraumatic, Nose midline, moist mucus  membranes  Neck - supple, no LAD  Respiratory- Clear to auscultation bilaterally, good inspiratory effort  Cardiovascular - Heart RRR, no lift's, thrills, murmurs, rubs, or gallop.  No peripheral edema.  No clubbing.  Abdomen - Soft, non-tender, +BS, no hepatosplenomegaly, no palpable masses  Groins - 2+ pulses bilaterally and no LAD, edge of mesh palpable left groin, well healed incision.  No bulge with valsalva.  No right inguinal hernia  Neuro - No focal neuro deficits, Alert and oriented x 3  Psych: Appropriate mood and affect  Musculoskeletal: Normal gait, symmetric strength.  FROM upper and lower extremities.  Skin: Warm, Dry    Assessment:  1. Inguinodynia, left      Plan:   Mr. Coleman presents with concerns of recurrent left inguinal hernia.  On exam, he has no evidence of hernia.  He has mild to minimal discomfort.  I discussed options of watchful waiting vs CT scan to rule out occult hernia.  He is comfortable with watchful waiting.  I discussed conservative management of groin strain including rest, ice, OTC Motrin/Tylenol.  Krames hernia guide reviewed with patient.  Signs/symptoms of incarceration/strangulation were reviewed.  All questions answered to the best of my ability.        Charles Vazquez, DO on 7/12/2022 at 11:28 AM

## 2022-08-02 DIAGNOSIS — C61 PROSTATE CANCER (H): Primary | ICD-10-CM

## 2022-08-02 RX ORDER — CIPROFLOXACIN 500 MG/1
500 TABLET, FILM COATED ORAL 2 TIMES DAILY
Qty: 6 TABLET | Refills: 0 | Status: SHIPPED | OUTPATIENT
Start: 2022-08-02 | End: 2022-08-05

## 2022-08-08 ENCOUNTER — PRE VISIT (OUTPATIENT)
Dept: UROLOGY | Facility: CLINIC | Age: 67
End: 2022-08-08

## 2022-08-08 NOTE — TELEPHONE ENCOUNTER
Reason for visit: prostate biopsy      Dx/Hx/Sx: prostate cancer     Records/imaging/labs/orders: MRI in [facility]    At Rooming: regular biopsy, gent, collect urine    Called patient to go over prep for biopsy including:      No blood thinning medications for 7 days before procedure, including aspirin, anticoagulants, ibuprofen and fish oil.       prophylactic antibiotics sent to primary pharmacy listed in chart:   -take the day before, the day of and the day after the procedure, one tablet, two times daily.      Complete a Fleets enema approximately 2 hours before the scheduled procedure.      Do not come to the appointment fasted.

## 2022-08-15 ENCOUNTER — OFFICE VISIT (OUTPATIENT)
Dept: UROLOGY | Facility: CLINIC | Age: 67
End: 2022-08-15
Payer: COMMERCIAL

## 2022-08-15 ENCOUNTER — PRE VISIT (OUTPATIENT)
Dept: UROLOGY | Facility: CLINIC | Age: 67
End: 2022-08-15

## 2022-08-15 VITALS
BODY MASS INDEX: 25.73 KG/M2 | SYSTOLIC BLOOD PRESSURE: 131 MMHG | HEART RATE: 90 BPM | DIASTOLIC BLOOD PRESSURE: 84 MMHG | HEIGHT: 72 IN | WEIGHT: 190 LBS

## 2022-08-15 DIAGNOSIS — C61 PROSTATE CANCER (H): Primary | ICD-10-CM

## 2022-08-15 PROCEDURE — 88305 TISSUE EXAM BY PATHOLOGIST: CPT | Mod: 26 | Performed by: PATHOLOGY

## 2022-08-15 PROCEDURE — 55700 PR BIOPSY OF PROSTATE,NEEDLE/PUNCH: CPT | Performed by: UROLOGY

## 2022-08-15 PROCEDURE — 76872 US TRANSRECTAL: CPT | Performed by: UROLOGY

## 2022-08-15 PROCEDURE — 88305 TISSUE EXAM BY PATHOLOGIST: CPT | Mod: TC | Performed by: UROLOGY

## 2022-08-15 RX ORDER — GENTAMICIN 40 MG/ML
80 INJECTION, SOLUTION INTRAMUSCULAR; INTRAVENOUS ONCE
Status: COMPLETED | OUTPATIENT
Start: 2022-08-15 | End: 2022-08-15

## 2022-08-15 RX ADMIN — GENTAMICIN 80 MG: 40 INJECTION, SOLUTION INTRAMUSCULAR; INTRAVENOUS at 08:27

## 2022-08-15 ASSESSMENT — PAIN SCALES - GENERAL: PAINLEVEL: NO PAIN (0)

## 2022-08-15 NOTE — PATIENT INSTRUCTIONS
[facility]  Precautions Following a Prostate Biopsy    There are four conditions that you should watch for after a prostate biopsy:    Excessive pain  Bleeding irregularities (passing numerous  dime sized  clots or if your urine looks like cranberry juice)  Fever of 100 degrees or more  If you are unable to urinate      If any of these occur, call the Urology Clinic during normal business hours (M-F, 8:00-4:30) at [phone].  If you experience a problem after normal business hours, call our 24-hour phone number at [phone] and ask for the Urology Resident on call to be paged.    If you experience any discomfort following the biopsy, you may take Tylenol.  DO NOT TAKE ASPIRIN unless specified by your physician.   If the discomfort becomes severe or uncontrolled by medication, contact the Urology Clinic or Urology Resident (after normal business hours).    Do not be alarmed if you have some blood in your stool, in your urine, or ejaculate (semen).  This occurrence is normal and may last up to three (3) or four (4) days, usually intermittently.  Blood in the ejaculate (semen) may last several weeks, up to about a dozen ejaculations.  The blood in your ejaculate may appear as brown streaks, blood tinged, and immediately following a biopsy, it may appear bright red.    If you run a fever above 100 degrees, call the Urology Clinic or Urology Resident (after normal business hours) immediately.  If you are unable to reach your physician or the Resident on call, go to the nearest emergency room.  Explain that you have had a transrectal biopsy of your prostate and what problems you are experiencing.      You should attempt to urinate following your biopsy before you leave the clinic.  If you are unable to urinate four (4) to six (6) hours after you leave the clinic, you will need to contact the Urology Clinic or the Resident on call.  If you are unable to reach your physician or the  Resident on call, go to the nearest emergency room.            If you have any questions or concerns after your biopsy, feel free to contact the Urology Clinic at [phone] during M-F, 8:00-5pm business hours.  If you need to speak with someone after normal business hours, call [phone] and ask for the Resident on call to be paged.

## 2022-08-15 NOTE — TELEPHONE ENCOUNTER
Reason for visit: biopsy results      Dx/Hx/Sx: prostate cancer     Records/imaging/labs/orders: pathology in epic    At Rooming: video visit

## 2022-08-15 NOTE — PROGRESS NOTES
Reason for visit: MRI ultrasound fusion prostate biopsy    Indications: Mr. Coleman is a 67 year old -year-old gentleman followed in clinic for prostate cancer. He presents today for  MRI/ultrasound fusion prostate biopsy.    Procedure: After informed consent was obtained and after confirming the patient has been off aspirin or aspirin like products for a week, took his antibiotics and perform his enema, the patient was placed left side down on the procedure table. Digital rectal exam was performed revealing a normal feeling prostate. The ultrasound probe was lubricated and placed into the patient's rectum without difficulty. Inspection of the prostate revealed a few calcifications, but no obvious hypoechoic regions.   Next 5 ccs of lidocaine were injected at the junction of the prostate and the seminal vesicles bilaterally.  Measurement of the prostate were then obtained revealing a volume of 110 ccs.   We then obtained 12 cores in the standard sextant distribution with an additional core each from the left and right transition zones for a total of 14 cores obtained. The patient tolerated the procedure without difficulty.    The patient was counseled he could expect to see blood in his urine, semen, and stool for the next several days and should contact us should he develop any fevers chills or sweats. We'll see the patient back in a week to go over the results of his biopsy.

## 2022-08-15 NOTE — NURSING NOTE
Verified with pt that he has been off aspirin products and blood thinners, started Cipro 500 mg BID x 3 days 8/14/22, and did a fleets enema this morning.    The following medication was given:     MEDICATION:  Gentamicin  ROUTE: IM  SITE: Ventrogluteal - Right  DOSE: 80 mg/ 2 mL  LOT #: [id number]  : [facility]  EXPIRATION DATE: 09/23  NDC#: 12960-118-52   Was there drug waste? No      Pt tolerated injection well.      Ashwini Segovia CMA  08/15/22  8:29 AM

## 2022-08-15 NOTE — LETTER
8/15/2022       RE: Karlos Coleman  [address] [facility] [address]     Dear Colleague,    Thank you for referring your patient, Karlos Coleman, to the [facility]IC [place] at [facility]. Please see a copy of my visit note below.    Reason for visit: MRI ultrasound fusion prostate biopsy    Indications: Mr. Coleman is a 67 year old -year-old gentleman followed in clinic for prostate cancer. He presents today for  MRI/ultrasound fusion prostate biopsy.    Procedure: After informed consent was obtained and after confirming the patient has been off aspirin or aspirin like products for a week, took his antibiotics and perform his enema, the patient was placed left side down on the procedure table. Digital rectal exam was performed revealing a normal feeling prostate. The ultrasound probe was lubricated and placed into the patient's rectum without difficulty. Inspection of the prostate revealed a few calcifications, but no obvious hypoechoic regions.   Next 5 ccs of lidocaine were injected at the junction of the prostate and the seminal vesicles bilaterally.  Measurement of the prostate were then obtained revealing a volume of 110 ccs.   We then obtained 12 cores in the standard sextant distribution with an additional core each from the left and right transition zones for a total of 14 cores obtained. The patient tolerated the procedure without difficulty.    The patient was counseled he could expect to see blood in his urine, semen, and stool for the next several days and should contact us should he develop any fevers chills or sweats. We'll see the patient back in a week to go over the results of his biopsy.     Sincerely,    Lawrence Champion MD

## 2022-08-21 LAB
PATH REPORT.COMMENTS IMP SPEC: ABNORMAL
PATH REPORT.COMMENTS IMP SPEC: YES
PATH REPORT.FINAL DX SPEC: ABNORMAL
PATH REPORT.GROSS SPEC: ABNORMAL
PATH REPORT.MICROSCOPIC SPEC OTHER STN: ABNORMAL
PATH REPORT.RELEVANT HX SPEC: ABNORMAL
PHOTO IMAGE: ABNORMAL

## 2022-08-22 ENCOUNTER — VIRTUAL VISIT (OUTPATIENT)
Dept: UROLOGY | Facility: CLINIC | Age: 67
End: 2022-08-22
Payer: COMMERCIAL

## 2022-08-22 DIAGNOSIS — C61 PROSTATE CANCER (H): Primary | ICD-10-CM

## 2022-08-22 PROCEDURE — 99213 OFFICE O/P EST LOW 20 MIN: CPT | Mod: 95 | Performed by: UROLOGY

## 2022-08-22 NOTE — PROGRESS NOTES
Ulysses is a 67 year old who is being evaluated via a billable video visit.      How would you like to obtain your AVS? MyChart  If the video visit is dropped, the invitation should be resent by: Text to cell phone: [phone]  Will anyone else be joining your video visit? No        Video-Visit Details    Video Start Time: 3:30pm    Type of service:  Video Visit  CC prostate cancer     HPI:Karlos Coleman is a 67 year old male with a history of oropharyngeal cancer s/p chemotherapy and radiation, BPH, and [place] 3+3= 6 prostate cancer.  His only prostate biopsy was at [facility] on 11/30/2017 PSA showing Amanda 3+3= 6 in the left lateral base with 40% tissue involvement.  At that time was 5.1 ng/mL.  He recently had a surveillance biopsy.    OBJECTIVE: Pathology from 8/15/22 shows Gl 3+3=6 in 25% of 1 core from the left base.    ASSESSMENT AND PLAN:  Over half of today's 25-minute visit was spent reviewing the chart, results and counseling the patient regarding his prostate cancer.  We are pleased to see the disease is stable.  Will continue with surveillance.  Will repeat the PSA in 6 months.    Video End Time:3:37pm    Originating Location (pt. Location): Home    Distant Location (provider location):  [facility]OLOGY CLIN[facility] [place]     Platform used for Video Visit: [email or web address]

## 2022-08-22 NOTE — LETTER
8/22/2022       RE: Karlos Coleman  [address] [facility] [address]     Dear Colleague,    Thank you for referring your patient, Karlos oCleman, to the [facility] UROLOGY CLINIC [place] at [facility]. Please see a copy of my visit note below.    Ulysses is a 67 year old who is being evaluated via a billable video visit.      How would you like to obtain your AVS? MyChart  If the video visit is dropped, the invitation should be resent by: Text to cell phone: [phone]  Will anyone else be joining your video visit? No        Video-Visit Details    Video Start Time: 3:30pm    Type of service:  Video Visit  CC prostate cancer     HPI:Karlos Coleman is a 67 year old male with a history of oropharyngeal cancer s/p chemotherapy and radiation, BPH, and [place] 3+3= 6 prostate cancer.  His only prostate biopsy was at [facility] on 11/30/2017 PSA showing [place] 3+3= 6 in the left lateral base with 40% tissue involvement.  At that time was 5.1 ng/mL.  He recently had a surveillance biopsy.    OBJECTIVE: Pathology from 8/15/22 shows Gl 3+3=6 in 25% of 1 core from the left base.    ASSESSMENT AND PLAN:  Over half of today's 25-minute visit was spent reviewing the chart, results and counseling the patient regarding his prostate cancer.  We are pleased to see the disease is stable.  Will continue with surveillance.  Will repeat the PSA in 6 months.    Video End Time:3:37pm    Originating Location (pt. Location): Home    Distant Location (provider location):  [facility]OLOGY CL[facility] [place]     Platform used for Video Visit: Alina Champion MD

## 2022-10-29 ENCOUNTER — HEALTH MAINTENANCE LETTER (OUTPATIENT)
Age: 67
End: 2022-10-29

## 2022-11-28 ENCOUNTER — OFFICE VISIT (OUTPATIENT)
Dept: FAMILY MEDICINE | Facility: CLINIC | Age: 67
End: 2022-11-28
Payer: COMMERCIAL

## 2022-11-28 ENCOUNTER — TELEPHONE (OUTPATIENT)
Dept: FAMILY MEDICINE | Facility: CLINIC | Age: 67
End: 2022-11-28

## 2022-11-28 VITALS
SYSTOLIC BLOOD PRESSURE: 110 MMHG | TEMPERATURE: 96.6 F | RESPIRATION RATE: 14 BRPM | WEIGHT: 185.8 LBS | DIASTOLIC BLOOD PRESSURE: 64 MMHG | HEART RATE: 61 BPM | BODY MASS INDEX: 26.01 KG/M2 | HEIGHT: 71 IN | OXYGEN SATURATION: 96 %

## 2022-11-28 DIAGNOSIS — E78.00 ELEVATED CHOLESTEROL: ICD-10-CM

## 2022-11-28 DIAGNOSIS — Z23 HIGH PRIORITY FOR 2019-NCOV VACCINE: ICD-10-CM

## 2022-11-28 DIAGNOSIS — R59.1 LYMPHADENOPATHY: Primary | ICD-10-CM

## 2022-11-28 LAB
BASOPHILS # BLD AUTO: 0 10E3/UL (ref 0–0.2)
BASOPHILS NFR BLD AUTO: 0 %
CHOLEST SERPL-MCNC: 163 MG/DL
EOSINOPHIL # BLD AUTO: 0.2 10E3/UL (ref 0–0.7)
EOSINOPHIL NFR BLD AUTO: 3 %
ERYTHROCYTE [DISTWIDTH] IN BLOOD BY AUTOMATED COUNT: 12.7 % (ref 10–15)
FASTING STATUS PATIENT QL REPORTED: NO
HCT VFR BLD AUTO: 44.9 % (ref 40–53)
HDLC SERPL-MCNC: 53 MG/DL
HGB BLD-MCNC: 15.2 G/DL (ref 13.3–17.7)
LDLC SERPL CALC-MCNC: 90 MG/DL
LYMPHOCYTES # BLD AUTO: 1.2 10E3/UL (ref 0.8–5.3)
LYMPHOCYTES NFR BLD AUTO: 24 %
MCH RBC QN AUTO: 31.1 PG (ref 26.5–33)
MCHC RBC AUTO-ENTMCNC: 33.9 G/DL (ref 31.5–36.5)
MCV RBC AUTO: 92 FL (ref 78–100)
MONOCYTES # BLD AUTO: 0.5 10E3/UL (ref 0–1.3)
MONOCYTES NFR BLD AUTO: 9 %
NEUTROPHILS # BLD AUTO: 3.2 10E3/UL (ref 1.6–8.3)
NEUTROPHILS NFR BLD AUTO: 64 %
NONHDLC SERPL-MCNC: 110 MG/DL
PLATELET # BLD AUTO: 179 10E3/UL (ref 150–450)
RBC # BLD AUTO: 4.89 10E6/UL (ref 4.4–5.9)
TRIGL SERPL-MCNC: 102 MG/DL
WBC # BLD AUTO: 5 10E3/UL (ref 4–11)

## 2022-11-28 PROCEDURE — 90471 IMMUNIZATION ADMIN: CPT | Performed by: PHYSICIAN ASSISTANT

## 2022-11-28 PROCEDURE — 91313 COVID-19 VACCINE BIVALENT BOOSTER 18+ (MODERNA): CPT | Performed by: PHYSICIAN ASSISTANT

## 2022-11-28 PROCEDURE — 36415 COLL VENOUS BLD VENIPUNCTURE: CPT | Performed by: PHYSICIAN ASSISTANT

## 2022-11-28 PROCEDURE — 80061 LIPID PANEL: CPT | Performed by: PHYSICIAN ASSISTANT

## 2022-11-28 PROCEDURE — 90715 TDAP VACCINE 7 YRS/> IM: CPT | Performed by: PHYSICIAN ASSISTANT

## 2022-11-28 PROCEDURE — 0134A COVID-19 VACCINE BIVALENT BOOSTER 18+ (MODERNA): CPT | Performed by: PHYSICIAN ASSISTANT

## 2022-11-28 PROCEDURE — 85025 COMPLETE CBC W/AUTO DIFF WBC: CPT | Performed by: PHYSICIAN ASSISTANT

## 2022-11-28 PROCEDURE — 99214 OFFICE O/P EST MOD 30 MIN: CPT | Mod: 25 | Performed by: PHYSICIAN ASSISTANT

## 2022-11-28 ASSESSMENT — ENCOUNTER SYMPTOMS
SINUS PRESSURE: 0
WEAKNESS: 0
FEVER: 0
SORE THROAT: 0
SINUS PAIN: 0
TROUBLE SWALLOWING: 0
NECK PAIN: 0
NAUSEA: 0
NECK STIFFNESS: 0
UNEXPECTED WEIGHT CHANGE: 0
LIGHT-HEADEDNESS: 0
FATIGUE: 0
RHINORRHEA: 0
HEADACHES: 0
NUMBNESS: 0
DIZZINESS: 0
VOICE CHANGE: 0
VOMITING: 0

## 2022-11-28 ASSESSMENT — PAIN SCALES - GENERAL: PAINLEVEL: NO PAIN (1)

## 2022-11-28 NOTE — Clinical Note
Derik Rodriguez- I saw Ulysses today in same day clinic. He was treated in the past for SCC of the tongue and nodes by you and has had a tender isolated right posterior chain node for the last week. I was thinking we get an US in 2 weeks if not resolving. No other symptoms. Just wanted to run this by you and see if you have any other recommendations. Thanks. Daniel

## 2022-11-28 NOTE — PATIENT INSTRUCTIONS
Leonardo Arora,    Thank you for allowing [facility] to manage your care.    I am unsure of the cause of your symptoms, but your exam is reassuring. We will see what our workup shows.     If you develop worsening/changing symptoms at any time, please call 911 or go to the emergency department for evaluation.    I ordered some lab work, please go to the laboratory to get your studies.    I ordered an ultrasound of f your neck if this is not improving in the next 2 weeks. Please call diagnostic imaging [phone] to schedule your test.    Please allow 1-2 business days for our office to contact you in regards to your laboratory/radiological studies.  If not done so, I encourage you to login into [email or web address] to review your results as well.     If you have any questions or concerns, please feel free to call us at [phone]    Sincerely,    Daniel Ramachandran PA-C    Did you know?      You can schedule a video visit for follow-up appointments as well as future appointments for certain conditions.  Please see the below link.     [email or web address]    If you have not already done so,  I encourage you to sign up for [email or web address] ([email or web address]).  This will allow you to review your results, securely communicate with a provider, and schedule virtual visits as well.

## 2022-11-28 NOTE — PROGRESS NOTES
"  Assessment & Plan   Problem List Items Addressed This Visit    None  Visit Diagnoses     Lymphadenopathy    -  Primary    Relevant Orders    US Head Neck Soft Tissue    CBC with platelets and differential (Completed)    High priority for 2019-nCoV vaccine        Relevant Orders    COVID-19,PF,MODERNA BIVALENT 18+Yrs (Completed)    Elevated cholesterol        Relevant Orders    Lipid panel reflex to direct LDL Non-fasting (Completed)         Isolated tender and mobile node in the posterior chain on the right. No other symptoms including no unintentional weight loss, night sweats, etc. No obvious infection or source of lymphadenopathy. CBC reassuring today. Will obtain US in 2 weeks if not improving and follow up with me sooner prn.    Complete history and physical exam as below. Afebrile with normal vital signs.    DDx and Dx discussed with and explained to the pt to their satisfaction.  All questions were answered at this time. Pt expressed understanding of and agreement with this dx, tx, and plan. No further workup warranted and standard medication warnings given. I have given the patient a list of pertinent indications for re-evaluation. Will go to the Emergency Department if symptoms worsen or new concerning symptoms arise. Patient left in no apparent distress.     Ordering of each unique test  32 minutes spent on the date of the encounter doing chart review, history and exam, documentation and further activities per the note     BMI:   Estimated body mass index is 26.07 kg/m  as calculated from the following:    Height as of this encounter: 1.798 m (5' 10.79\").    Weight as of this encounter: 84.3 kg (185 lb 12.8 oz).     See Patient Instructions    Return in about 2 weeks (around 12/12/2022) for for your ultrasound, a recheck as needed.    GISELE Yadav  [facility] MARIA DOLORES      ASSESSMENT & PLAN:     Ashvin Arora is a 67 year old presenting for the following health issues:  Mass " "and Imm/Inj (COVID-19 VACCINE)      HPI   Ulysses notes a mass on his posterior right neck which appeared about a week ago. He notes that the nodule is a little tender. He hasn't put any creams on it or taken anything to help it go away. No other rashes or systemic symptoms. He endorses bloody gums for 1 week, but notes that he has bloody or irritated gums frequently. He denies trauma to the area and fever.    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 Denies night sweats, fatigue, and unintentional weight loss.     Lump on back of neck. 1 week. Pea size. Painful. Has not done anything to it. Bloody gums. 1 week    Review of Systems   Constitutional: Negative for fatigue, fever and unexpected weight change.   HENT: Negative for congestion, ear discharge, ear pain, rhinorrhea, sinus pressure, sinus pain, sore throat, trouble swallowing and voice change.    Cardiovascular: Negative for peripheral edema.   Gastrointestinal: Negative for nausea and vomiting.   Genitourinary: Negative for urgency.   Musculoskeletal: Negative for neck pain and neck stiffness.   Skin: Negative for rash.   Neurological: Negative for dizziness, weakness, light-headedness, numbness and headaches.             Hematological:        Posterior chain cervical LAD          Objective    /64   Pulse 61   Temp (!) 96.6  F (35.9  C) (Tympanic)   Resp 14   Ht 1.798 m (5' 10.79\")   Wt 84.3 kg (185 lb 12.8 oz)   SpO2 96%   BMI 26.07 kg/m    Body mass index is 26.07 kg/m .  Physical Exam  Constitutional:       General: He is not in acute distress.     Appearance: Normal appearance. He is normal weight. He is not ill-appearing.   HENT:      Head: Normocephalic and atraumatic.      Nose: Nose normal.      Mouth/Throat:      Mouth: Mucous membranes are moist.      Pharynx: Oropharynx is clear. No oropharyngeal exudate or posterior oropharyngeal erythema.      Comments: No lesions noted    Eyes:      " Extraocular Movements: Extraocular movements intact.      Pupils: Pupils are equal, round, and reactive to light.   Neck:      Comments: No skin changes over posterior neck.   Posterior chain lymph node is palpable, about 2 cm.   Slight tenderness on palpation of lymph node.   Normal range of motion of neck.   Cardiovascular:      Rate and Rhythm: Normal rate and regular rhythm.      Pulses: Normal pulses.      Heart sounds: Normal heart sounds. No murmur heard.  Pulmonary:      Effort: Pulmonary effort is normal. No respiratory distress.      Breath sounds: Normal breath sounds.   Abdominal:      General: Abdomen is flat.   Musculoskeletal:         General: No swelling.      Cervical back: Normal range of motion. No rigidity.   Skin:     Findings: No bruising, erythema or rash.   Neurological:      General: No focal deficit present.      Mental Status: He is alert and oriented to person, place, and time. Mental status is at baseline.

## 2022-11-29 DIAGNOSIS — R59.1 LYMPHADENOPATHY: Primary | ICD-10-CM

## 2022-11-30 ENCOUNTER — TELEPHONE (OUTPATIENT)
Dept: OTOLARYNGOLOGY | Facility: CLINIC | Age: 67
End: 2022-11-30

## 2022-11-30 NOTE — TELEPHONE ENCOUNTER
LVM with scheduled appt info and call back number for questions, concerns, or to reschedule.    per clinic -- Please offer patient an appointment with Jennifer on 12/15 at 3:40pm. DO NOT OFFER LATER TIME

## 2022-12-01 ENCOUNTER — ANCILLARY PROCEDURE (OUTPATIENT)
Dept: ULTRASOUND IMAGING | Facility: CLINIC | Age: 67
End: 2022-12-01
Attending: PHYSICIAN ASSISTANT
Payer: COMMERCIAL

## 2022-12-01 DIAGNOSIS — R59.1 LYMPHADENOPATHY: ICD-10-CM

## 2022-12-01 PROCEDURE — 76536 US EXAM OF HEAD AND NECK: CPT | Mod: TC | Performed by: RADIOLOGY

## 2022-12-02 ENCOUNTER — MYC MEDICAL ADVICE (OUTPATIENT)
Dept: OTOLARYNGOLOGY | Facility: CLINIC | Age: 67
End: 2022-12-02

## 2022-12-05 NOTE — TELEPHONE ENCOUNTER
FUTURE VISIT INFORMATION      FUTURE VISIT INFORMATION:    Date: 12/15/22    Time: 3:40PM    Location: [facility]  REFERRAL INFORMATION:    Referring provider:  Valente Ramachandran PA    Referring providers clinic:  [facility]     Reason for visit/diagnosis  Ref by Valente Ramachandran for a lump on the back of his neck. (Last seen 7/5/2018)    RECORDS REQUESTED FROM:       Clinic name Comments Records Status Imaging Status   [facility] E[facility] [place]  7/5/18 note from Dr Rodriguez   10/1/12 Direct Laryngoscopy, Left Modified Radical Neck Dissection    [facility]    Imaging  12/1/22 US HEad Neck   10/15/12 XR Video Swallow   4/19/12 PET CT Epic PACS   [facility]  11/29/22 referral   11/28/22 note from Valente Ramachandran PA [facility]

## 2022-12-12 ENCOUNTER — TELEPHONE (OUTPATIENT)
Dept: OTOLARYNGOLOGY | Facility: CLINIC | Age: 67
End: 2022-12-12

## 2022-12-12 NOTE — TELEPHONE ENCOUNTER
Writer spoke with patient and confirmed the appointment is not needed. Dr. Rodriguez did look at the US =, which looks normal and patient states the lump is now gone and blood work looks great.     Agreed to cancel appointment per patient request.     Lidia Son RN on 12/12/2022 at 12:43 PM

## 2022-12-12 NOTE — TELEPHONE ENCOUNTER
[facility] Call Center    Phone Message    May a detailed message be left on voicemail: yes     Reason for Call: Other: Pt is wondering if he still needs the appointment on 12/15/22 with Dr. Rodriguez based off his bloodwork and ultrasound.  Please advise. Thank you!     Action Taken: Message routed to:  [facility] ENT    Travel Screening: Not Applicable

## 2022-12-15 ENCOUNTER — PRE VISIT (OUTPATIENT)
Dept: OTOLARYNGOLOGY | Facility: CLINIC | Age: 67
End: 2022-12-15

## 2023-02-08 ENCOUNTER — TELEPHONE (OUTPATIENT)
Dept: FAMILY MEDICINE | Facility: CLINIC | Age: 68
End: 2023-02-08
Payer: COMMERCIAL

## 2023-02-08 NOTE — TELEPHONE ENCOUNTER
RN COVID TREATMENT VISIT  02/08/23    Karlos Coleman  67 year old  Current weight? 185    Has the patient been seen by a primary care provider at an [facility] or [facility] within the past two years? No, therefore patient is not eligible for COVID treatment standing order. Patient informed a virtual visit with a provider will be required for treatment. Patient will be transferred to a  at the end of this call.     Offered to Transfer Patient , but Patient declined at this time. Symptoms are mild and he is healthy. Does not think he will need medication.  Arcenio Li RN

## 2023-02-13 ENCOUNTER — TELEPHONE (OUTPATIENT)
Dept: NURSING | Facility: CLINIC | Age: 68
End: 2023-02-13
Payer: COMMERCIAL

## 2023-02-13 NOTE — TELEPHONE ENCOUNTER
Patient calling. He tested positive for Covid on Wednesday.Symptoms started on Wednesday as well.  Patient having very mild symptoms of body aches and mild cough. Symptoms are improved and no fever. Patient asking about returning to work and quarantining.     Reviewed the following with patient and sent to patient via [employer] message for review.  If you have symptoms:    Stay home and away from others until all three of the following are true:    It is at least five full days since you first felt sick. Day zero is the day your symptoms started. Day one is the first full day after your symptoms started.  You have had no fever (your temperature is 100.4 degrees Fahrenheit or lower) for at least 24 hours, without using medicine that lowers fevers.  You feel better. Your cough, shortness of breath, or other symptoms are better.  On day six, if all of these things are true, your period to stay home is over and you can resume most activities. Continue to follow recommendations listed below through day 10. If you do not feel better by day six, continue to stay home until all the above are true.    If you do not have symptoms:    Stay home and away from others for at least five full days after your test date. Day zero is the day you got tested. Day one is the first full day after the day of your test.  If you develop symptoms during this time, you must start over. Day zero is the day your symptoms start. Day one is the first full day after your symptoms started. Refer to the section above on what to do if you have symptoms.  You can resume most activities on day six. Continue to follow recommendations listed below through day 10.  After your period to stay home ends, continue to do the following through day 10:    Continue to wear a high-quality and well-fitting mask, even at home.  Do not be around others who are at risk for getting very sick with COVID-19 at least until day 11, including older adults, people living in  long-term care facilities, and people with health conditions like asthma, diabetes, heart disease, obesity, or weakened immune systems.  Do not go to places where you will need to take off your mask to take part in an activity (e.g., gyms, restaurants) and avoid eating near others, including at home.  Avoid travel. If you must travel after your period to stay home ends, wear a high-quality and well-fitting mask.  CDC guidance provides an option to consider use of COVID-19 antigen tests (self-tests) to find out if you can remove your mask before day 11. If your stay at home period has ended (it is at least day 6) and you are feeling better (no fever for 24 hours without the use of fever-lowering medications and symptoms improving), you can take two antigen tests 48 hours apart, and if both are negative, you may stop wearing a mask.  This means the earliest you would be able to stop wearing a mask is day 8. Take the first test on day 6, the second test on day 8.  If both test results are negative, you may stop wearing a mask but continue to be cautious around others who are at higher risk of getting very sick from COVID-19. Antigen tests are designed to detect infection, not necessarily determine if someone is still able to spread the virus (contagious).  If either test result is positive, you should continue taking antigen tests at least 48 hours apart until you have two negative results in a row. This may mean you need to continue wearing a mask and testing beyond day 10.    Nereyda Aguilar RN   02/13/23 1:41 PM  [facility] Nurse Advisor

## 2023-02-20 ENCOUNTER — LAB (OUTPATIENT)
Dept: LAB | Facility: CLINIC | Age: 68
End: 2023-02-20
Payer: COMMERCIAL

## 2023-02-20 DIAGNOSIS — C61 PROSTATE CANCER (H): ICD-10-CM

## 2023-02-20 LAB — PSA SERPL-MCNC: 6.67 UG/L (ref 0–4)

## 2023-02-20 PROCEDURE — 84153 ASSAY OF PSA TOTAL: CPT

## 2023-02-20 PROCEDURE — 36415 COLL VENOUS BLD VENIPUNCTURE: CPT

## 2023-03-14 ENCOUNTER — TELEPHONE (OUTPATIENT)
Dept: FAMILY MEDICINE | Facility: CLINIC | Age: 68
End: 2023-03-14
Payer: COMMERCIAL

## 2023-03-14 NOTE — TELEPHONE ENCOUNTER
Patient Quality Outreach    Patient is due for the following:   Physical Annual Wellness Visit      Topic Date Due     Pneumococcal Vaccine (1 - PCV) Never done     Flu Vaccine (1) Never done       Next Steps:   Patient has upcoming appointment, these items will be addressed at that time.    Type of outreach:    Chart review performed, no outreach needed.      Questions for provider review:    None     Elizabet Linder  Chart routed to self.

## 2023-03-22 ENCOUNTER — NURSE TRIAGE (OUTPATIENT)
Dept: NURSING | Facility: CLINIC | Age: 68
End: 2023-03-22

## 2023-03-22 ENCOUNTER — OFFICE VISIT (OUTPATIENT)
Dept: FAMILY MEDICINE | Facility: CLINIC | Age: 68
End: 2023-03-22
Payer: COMMERCIAL

## 2023-03-22 VITALS
TEMPERATURE: 97.4 F | HEART RATE: 63 BPM | BODY MASS INDEX: 26.74 KG/M2 | OXYGEN SATURATION: 98 % | HEIGHT: 71 IN | SYSTOLIC BLOOD PRESSURE: 104 MMHG | DIASTOLIC BLOOD PRESSURE: 68 MMHG | WEIGHT: 191 LBS

## 2023-03-22 DIAGNOSIS — Z87.442 HISTORY OF KIDNEY STONES: ICD-10-CM

## 2023-03-22 DIAGNOSIS — C61 PROSTATE CANCER (H): ICD-10-CM

## 2023-03-22 DIAGNOSIS — C01 MALIGNANT NEOPLASM OF BASE OF TONGUE (H): ICD-10-CM

## 2023-03-22 DIAGNOSIS — R31.0 GROSS HEMATURIA: Primary | ICD-10-CM

## 2023-03-22 LAB
ALBUMIN UR-MCNC: NEGATIVE MG/DL
APPEARANCE UR: CLEAR
BILIRUB UR QL STRIP: NEGATIVE
COLOR UR AUTO: YELLOW
GLUCOSE UR STRIP-MCNC: NEGATIVE MG/DL
HGB UR QL STRIP: NEGATIVE
KETONES UR STRIP-MCNC: NEGATIVE MG/DL
LEUKOCYTE ESTERASE UR QL STRIP: NEGATIVE
NITRATE UR QL: NEGATIVE
PH UR STRIP: 5.5 [PH] (ref 5–7)
SP GR UR STRIP: >=1.03 (ref 1–1.03)
UROBILINOGEN UR STRIP-ACNC: 0.2 E.U./DL

## 2023-03-22 PROCEDURE — 81003 URINALYSIS AUTO W/O SCOPE: CPT | Performed by: FAMILY MEDICINE

## 2023-03-22 PROCEDURE — 99213 OFFICE O/P EST LOW 20 MIN: CPT | Performed by: FAMILY MEDICINE

## 2023-03-22 ASSESSMENT — PATIENT HEALTH QUESTIONNAIRE - PHQ9
SUM OF ALL RESPONSES TO PHQ QUESTIONS 1-9: 2
SUM OF ALL RESPONSES TO PHQ QUESTIONS 1-9: 2
10. IF YOU CHECKED OFF ANY PROBLEMS, HOW DIFFICULT HAVE THESE PROBLEMS MADE IT FOR YOU TO DO YOUR WORK, TAKE CARE OF THINGS AT HOME, OR GET ALONG WITH OTHER PEOPLE: NOT DIFFICULT AT ALL

## 2023-03-22 NOTE — TELEPHONE ENCOUNTER
Pt is asking if he can have a UA/UC ordered.  Will send to the clinic to advice.  If worse needs to call back.     Monday aches jaws.  Some pain some blood in urine.  Started today.      Nurse Triage SBAR    Is this a 2nd Level Triage? YES, LICENSED PRACTITIONER REVIEW IS REQUIRED    Situation:   Pt had some body aches in his jaw and stomach ache on Monday.  No chest pain.  Today noticed some blood in his urine.     Background:   Hx of kidney stone 10 years ago.      Assessment:   Pt has scant blood in the urine. No pain, fever.      Protocol Recommended Disposition:   No disposition on file.    Recommendation:   will send a note.  If worse pt is to call back.     Additional Information    Negative: Shock suspected (e.g., cold/pale/clammy skin, too weak to stand, low BP, rapid pulse)    Negative: Sounds like a life-threatening emergency to the triager    Negative: [1] Unable to urinate (or only a few drops) > 4 hours AND [2] bladder feels very full (e.g., palpable bladder or strong urge to urinate)    Negative: [1] Decreased urination and [2] drinking very little AND [2] dehydration suspected (e.g., dark urine, no urine > 12 hours, very dry mouth, very lightheaded)    Negative: Patient sounds very sick or weak to the triager    Negative: Fever > 100.4 F (38.0 C)    Negative: Side (flank) or lower back pain present    Negative: [1] Can't control passage of urine (i.e., urinary incontinence) AND [2] new-onset (< 2 weeks) or worsening    Negative: Urinating more frequently than usual (i.e., frequency)    Negative: Bad or foul-smelling urine    Protocols used: URINARY SYMPTOMS-A-      Phoebe Lopez RN on 3/22/2023 at 6:31 AM

## 2023-03-22 NOTE — TELEPHONE ENCOUNTER
Call placed to Patient  States that he does not have an UTI, had UA done this morning  Was told to continue monitoring and if symptoms worsen then to be seen in clinic or UC.  Patient states that he feels fine.  Arcenio Li RN

## 2023-03-22 NOTE — PROGRESS NOTES
"  Assessment & Plan     Gross hematuria  Absolutely none currently    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)  Stable at this point followed by unk    Prostate cancer (H)  Slow growing unless something changes it is just watch and see    History of kidney stones  Has not had any pain like this the last one was many years ago           BMI:   Estimated body mass index is 26.87 kg/m  as calculated from the following:    Height as of this encounter: 1.796 m (5' 10.7\").    Weight as of this encounter: 86.6 kg (191 lb).       Patient Instructions   If the blood in the urine returns , please see urology       Lamar Vigil MD  [facility] SOPHIE Arora is a 67 year old, presenting for the following health issues:  Urinary Problem  No flowsheet data found.  History of Present Illness       Reason for visit:  Blood in the urine  Symptom onset:  1-3 days ago  Symptom intensity:  Mild  Symptom progression:  Staying the same  Had these symptoms before:  No  What makes it worse:  No  What makes it better:  No    He eats 4 or more servings of fruits and vegetables daily.He consumes 0 sweetened beverage(s) daily.He exercises with enough effort to increase his heart rate 30 to 60 minutes per day.  He exercises with enough effort to increase his heart rate 3 or less days per week.   He is taking medications regularly.    Today's PHQ-9         PHQ-9 Total Score: 2    PHQ-9 Q9 Thoughts of better off dead/self-harm past 2 weeks :   Not at all    How difficult have these problems made it for you to do your work, take care of things at home, or get along with other people: Not difficult at all       Note  Nurse Triage Note 3/22/23 6:31am  Pt is asking if he can have a UA/UC ordered.  Will send to the clinic to advice.  If worse needs to call back.   Monday aches jaws.  Some pain some blood in urine.  Started today.    Nurse Triage SBAR  Is this a 2nd Level Triage? YES, LICENSED PRACTITIONER REVIEW IS " "REQUIRED  Situation:   Pt had some body aches in his jaw and stomach ache on Monday.  No chest pain.  Today noticed some blood in his urine.   Background:   Hx of kidney stone 10 years ago.    Assessment:   Pt has scant blood in the urine. No pain, fever.            Genitourinary - Male  Onset/Duration: 1 day  Description:   Dysuria (painful urination): YES  Hematuria (blood in urine): YES  Frequency: No  Waking at night to urinate: No  Hesitancy (delay in urine): No  Retention (unable to empty): No  Decrease in urinary flow: No  Incontinence: No  Progression of Symptoms:  same  Accompanying Signs & Symptoms:  Fever: No  Back/Flank pain: No  Urethral discharge: No  Testicle lumps/masses/pain: No  Nausea and/or vomiting: No  Abdominal pain: YES - on Monday   History:   History of frequent UTI s: No  History of kidney stones: YES  History of hernias: YES  Personal or Family history of Prostate problems: YES  Sexually active: YES  Precipitating or alleviating factors: None  Therapies tried and outcome: none    The urine shows no rbc or wbc has a history of slow-growing prostate cancer but has had no issues recently he does have a history of kidney stones many years ago but there is no real pain associated with this he felt a little off on Monday and then he did have 2 episodes of blood in the urine.  There is absolutely none today.      Review of Systems   Constitutional, HEENT, cardiovascular, pulmonary, gi and gu systems are negative, except as otherwise noted.      Objective    /68 (BP Location: Right arm, Patient Position: Sitting, Cuff Size: Adult Regular)   Pulse 63   Temp 97.4  F (36.3  C) (Tympanic)   Ht 1.796 m (5' 10.7\")   Wt 86.6 kg (191 lb)   SpO2 98%   BMI 26.87 kg/m    Body mass index is 26.87 kg/m .  Physical Exam   GENERAL: healthy, alert and no distress    Results for orders placed or performed in visit on 03/22/23 (from the past 24 hour(s))   UA Macro with Reflex to Micro and Culture - lab " collect    Specimen: Urine, Clean Catch   Result Value Ref Range    Color Urine Yellow Colorless, Straw, Light Yellow, Yellow    Appearance Urine Clear Clear    Glucose Urine Negative Negative mg/dL    Bilirubin Urine Negative Negative    Ketones Urine Negative Negative mg/dL    Specific Gravity Urine >=1.030 1.003 - 1.035    Blood Urine Negative Negative    pH Urine 5.5 5.0 - 7.0    Protein Albumin Urine Negative Negative mg/dL    Urobilinogen Urine 0.2 0.2, 1.0 E.U./dL    Nitrite Urine Negative Negative    Leukocyte Esterase Urine Negative Negative    Narrative    Microscopic not indicated       Lamar Vigil M.D.

## 2023-04-17 ASSESSMENT — ENCOUNTER SYMPTOMS
HEADACHES: 0
CHILLS: 0
DIZZINESS: 0
NERVOUS/ANXIOUS: 0
ARTHRALGIAS: 0
HEMATURIA: 0
FEVER: 0
NAUSEA: 0
CONSTIPATION: 0
EYE PAIN: 0
ABDOMINAL PAIN: 0
HEMATOCHEZIA: 0
PARESTHESIAS: 0
WEAKNESS: 0
JOINT SWELLING: 0
DYSURIA: 0
COUGH: 0
FREQUENCY: 0
SHORTNESS OF BREATH: 0
PALPITATIONS: 0
DIARRHEA: 0
SORE THROAT: 0
HEARTBURN: 0
MYALGIAS: 0

## 2023-04-17 ASSESSMENT — ACTIVITIES OF DAILY LIVING (ADL): CURRENT_FUNCTION: NO ASSISTANCE NEEDED

## 2023-04-24 ENCOUNTER — OFFICE VISIT (OUTPATIENT)
Dept: FAMILY MEDICINE | Facility: CLINIC | Age: 68
End: 2023-04-24
Payer: COMMERCIAL

## 2023-04-24 VITALS
RESPIRATION RATE: 14 BRPM | HEIGHT: 71 IN | WEIGHT: 191.8 LBS | HEART RATE: 57 BPM | SYSTOLIC BLOOD PRESSURE: 116 MMHG | DIASTOLIC BLOOD PRESSURE: 72 MMHG | OXYGEN SATURATION: 98 % | BODY MASS INDEX: 26.85 KG/M2 | TEMPERATURE: 97.4 F

## 2023-04-24 DIAGNOSIS — Z00.00 WELCOME TO MEDICARE PREVENTIVE VISIT: Primary | ICD-10-CM

## 2023-04-24 DIAGNOSIS — B35.1 DERMATOPHYTIC ONYCHIA: ICD-10-CM

## 2023-04-24 PROCEDURE — G0438 PPPS, INITIAL VISIT: HCPCS | Performed by: FAMILY MEDICINE

## 2023-04-24 PROCEDURE — 93000 ELECTROCARDIOGRAM COMPLETE: CPT | Performed by: FAMILY MEDICINE

## 2023-04-24 RX ORDER — TERBINAFINE HYDROCHLORIDE 250 MG/1
250 TABLET ORAL DAILY
Qty: 90 TABLET | Refills: 0 | Status: SHIPPED | OUTPATIENT
Start: 2023-04-24 | End: 2023-06-21

## 2023-04-24 ASSESSMENT — ENCOUNTER SYMPTOMS
NERVOUS/ANXIOUS: 0
SHORTNESS OF BREATH: 0
DYSURIA: 0
HEMATOCHEZIA: 0
CONSTIPATION: 0
PALPITATIONS: 0
DIZZINESS: 0
HEADACHES: 0
HEMATURIA: 0
EYE PAIN: 0
HEARTBURN: 0
CHILLS: 0
MYALGIAS: 0
PARESTHESIAS: 0
COUGH: 0
NAUSEA: 0
JOINT SWELLING: 0
FEVER: 0
FREQUENCY: 0
SORE THROAT: 0
ARTHRALGIAS: 0
DIARRHEA: 0
ABDOMINAL PAIN: 0
WEAKNESS: 0

## 2023-04-24 ASSESSMENT — ACTIVITIES OF DAILY LIVING (ADL): CURRENT_FUNCTION: NO ASSISTANCE NEEDED

## 2023-04-24 ASSESSMENT — PAIN SCALES - GENERAL: PAINLEVEL: NO PAIN (0)

## 2023-04-24 NOTE — PROGRESS NOTES
"SUBJECTIVE:   Ulysses is a 68 year old who presents for Preventive Visit.       View : No data to display.              Patient has been advised of split billing requirements and indicates understanding: Yes  Are you in the first 12 months of your Medicare coverage?  Yes,  Visual Acuity:  Right Eye: 20/40   Left Eye: 20/80  Both Eyes: 20/32 WAS WEARING HIS CONTACTS WITH [employer].    Healthy Habits:     In general, how would you rate your overall health?  Excellent    Frequency of exercise:  4-5 days/week    Duration of exercise:  30-45 minutes    Do you usually eat at least 4 servings of fruit and vegetables a day, include whole grains    & fiber and avoid regularly eating high fat or \"junk\" foods?  Yes    Taking medications regularly:  Not Applicable    Medication side effects:  Not applicable    Ability to successfully perform activities of daily living:  No assistance needed    Home Safety:  No safety concerns identified    Hearing Impairment:  No hearing concerns    In the past 6 months, have you been bothered by leaking of urine?  No    In general, how would you rate your overall mental or emotional health?  Excellent      PHQ-2 Total Score: 0    Additional concerns today:  Yes    -Wants to discuss muscle tightness, left great toe infection, High BP and prostate cancer.    Have you ever done Advance Care Planning? (For example, a Health Directive, POLST, or a discussion with a medical provider or your loved ones about your wishes):     Fall risk  Fallen 2 or more times in the past year?: No  Any fall with injury in the past year?: No    Cognitive Screening   1) Repeat 3 items (Leader, Season, Table)    2) Clock draw: NORMAL  3) 3 item recall: Recalls 3 objects  Results: 3 items recalled: COGNITIVE IMPAIRMENT LESS LIKELY, normal clock    Mini-CogTM Copyright VIRGINIA Gill. Licensed by the author for use in [facility]; reprinted with permission (noy@.[facility]). All rights reserved.      Do you have sleep " apnea, excessive snoring or daytime drowsiness?: no    Reviewed and updated as needed this visit by clinical staff   Tobacco  Allergies  Meds   Med Hx  Surg Hx  Fam Hx          Reviewed and updated as needed this visit by Provider                 Social History     Tobacco Use     Smoking status: Never     Smokeless tobacco: Never   Vaping Use     Vaping status: Never Used   Substance Use Topics     Alcohol use: No     Comment: none             4/17/2023     8:57 AM   Alcohol Use   Prescreen: >3 drinks/day or >7 drinks/week? Not Applicable          View : No data to display.              Do you have a current opioid prescription?   Do you use any other controlled substances or medications that are not prescribed by a provider?     Current providers sharing in care for this patient include:   Patient Care Team:  Drew Raines MD as PCP - General (Family Practice)  Usha Rojas MD as MD (Otolaryngology)  Drew Raines MD as MD (Family Practice)  Drew Raines MD as Assigned PCP  Lawrence Champion MD as Assigned Surgical Provider    The following health maintenance items are reviewed in Epic and correct as of today:  Health Maintenance   Topic Date Due     Pneumococcal Vaccine: 65+ Years (1 - PCV) Never done     AORTIC ANEURYSM SCREENING (SYSTEM ASSIGNED)  Never done     INFLUENZA VACCINE (1) Never done     LIPID  11/28/2023     MEDICARE ANNUAL WELLNESS VISIT  04/24/2024     ANNUAL REVIEW OF HM ORDERS  04/24/2024     FALL RISK ASSESSMENT  04/24/2024     ADVANCE CARE PLANNING  08/27/2025     COLORECTAL CANCER SCREENING  07/29/2031     DTAP/TDAP/TD IMMUNIZATION (4 - Td or Tdap) 11/28/2032     HEPATITIS C SCREENING  Completed     PHQ-2 (once per calendar year)  Completed     ZOSTER IMMUNIZATION  Completed     COVID-19 Vaccine  Completed     IPV IMMUNIZATION  Aged Out     MENINGITIS IMMUNIZATION  Aged Out     Review of Systems   Constitutional: Negative for chills and fever.  "  HENT: Negative for congestion, ear pain, hearing loss and sore throat.    Eyes: Negative for pain and visual disturbance.   Respiratory: Negative for cough and shortness of breath.    Cardiovascular: Negative for chest pain, palpitations and peripheral edema.   Gastrointestinal: Negative for abdominal pain, constipation, diarrhea, heartburn, hematochezia and nausea.   Genitourinary: Negative for dysuria, frequency, genital sores, hematuria, impotence, penile discharge and urgency.   Musculoskeletal: Negative for arthralgias, joint swelling and myalgias.   Skin: Negative for rash.   Neurological: Negative for dizziness, weakness, headaches and paresthesias.   Psychiatric/Behavioral: Negative for mood changes. The patient is not nervous/anxious.          OBJECTIVE:   /72   Pulse 57   Temp 97.4  F (36.3  C) (Tympanic)   Resp 14   Ht 1.796 m (5' 10.7\")   Wt 87 kg (191 lb 12.8 oz)   SpO2 98%   BMI 26.98 kg/m   Estimated body mass index is 26.98 kg/m  as calculated from the following:    Height as of this encounter: 1.796 m (5' 10.7\").    Weight as of this encounter: 87 kg (191 lb 12.8 oz).  Physical Exam  GENERAL: healthy, alert and no distress  NECK: no adenopathy, no asymmetry, masses, or scars and thyroid normal to palpation  RESP: lungs clear to auscultation - no rales, rhonchi or wheezes  CV: regular rate and rhythm, normal S1 S2, no S3 or S4, no murmur, click or rub, no peripheral edema and peripheral pulses strong  ABDOMEN: soft, nontender, no hepatosplenomegaly, no masses and bowel sounds normal  MS: no gross musculoskeletal defects noted, no edema        ASSESSMENT / PLAN:   (Z00.00) Welcome to Medicare preventive visit  (primary encounter diagnosis)  Plan: REVIEW OF HEALTH MAINTENANCE PROTOCOL ORDERS,         EKG 12-lead complete w/read - Clinics, Lipid         panel reflex to direct LDL Fasting      (B35.1) Dermatophytic onychia doiscussed risk liver rachele e need for lft's check and limit " alcohol  Plan: terbinafine (LAMISIL) 250 MG tablet, ALT            COUNSELING:  Reviewed preventive health counseling, as reflected in patient instructions  Special attention given to:       Consider AAA screening for ages 65-75 and smoking history       Regular exercise       Healthy diet/nutrition       Vision screening       Hearing screening       Dental care       Bladder control       Fall risk prevention       Colon cancer screening       Prostate cancer screening        He reports that he has never smoked. He has never used smokeless tobacco.      Appropriate preventive services were discussed with this patient, including applicable screening as appropriate for cardiovascular disease, diabetes, osteopenia/osteoporosis, and glaucoma.  As appropriate for age/gender, discussed screening for colorectal cancer, prostate cancer, breast cancer, and cervical cancer. Checklist reviewing preventive services available has been given to the patient.    Reviewed patients plan of care and provided an AVS. The Intermediate Care Plan ( asthma action plan, low back pain action plan, and migraine action plan) for Karlos meets the Care Plan requirement. This Care Plan has been established and reviewed with the Patient.      Drew Raines MD  [facility]    Identified Health Risks:

## 2023-04-24 NOTE — PATIENT INSTRUCTIONS
Patient Education   Personalized Prevention Plan  You are due for the preventive services outlined below.  Your care team is available to assist you in scheduling these services.  If you have already completed any of these items, please share that information with your care team to update in your medical record.  Health Maintenance Due   Topic Date Due     Pneumococcal Vaccine (1 - PCV) Never done     AORTIC ANEURYSM SCREENING (SYSTEM ASSIGNED)  Never done     Flu Vaccine (1) Never done

## 2023-06-15 ENCOUNTER — LAB (OUTPATIENT)
Dept: LAB | Facility: CLINIC | Age: 68
End: 2023-06-15
Payer: COMMERCIAL

## 2023-06-15 DIAGNOSIS — B35.1 DERMATOPHYTIC ONYCHIA: ICD-10-CM

## 2023-06-15 DIAGNOSIS — Z00.00 WELCOME TO MEDICARE PREVENTIVE VISIT: ICD-10-CM

## 2023-06-15 LAB
ALT SERPL W P-5'-P-CCNC: 18 U/L (ref 0–70)
CHOLEST SERPL-MCNC: 180 MG/DL
HDLC SERPL-MCNC: 46 MG/DL
LDLC SERPL CALC-MCNC: 119 MG/DL
NONHDLC SERPL-MCNC: 134 MG/DL
TRIGL SERPL-MCNC: 77 MG/DL

## 2023-06-15 PROCEDURE — 80061 LIPID PANEL: CPT

## 2023-06-15 PROCEDURE — 36415 COLL VENOUS BLD VENIPUNCTURE: CPT

## 2023-06-15 PROCEDURE — 84460 ALANINE AMINO (ALT) (SGPT): CPT

## 2023-06-20 ENCOUNTER — MYC MEDICAL ADVICE (OUTPATIENT)
Dept: FAMILY MEDICINE | Facility: CLINIC | Age: 68
End: 2023-06-20
Payer: COMMERCIAL

## 2023-06-20 DIAGNOSIS — Z13.6 CARDIOVASCULAR SCREENING; LDL GOAL LESS THAN 160: Primary | ICD-10-CM

## 2023-06-20 DIAGNOSIS — B35.1 DERMATOPHYTIC ONYCHIA: ICD-10-CM

## 2023-06-21 RX ORDER — TERBINAFINE HYDROCHLORIDE 250 MG/1
250 TABLET ORAL DAILY
Qty: 90 TABLET | Refills: 0 | Status: SHIPPED | OUTPATIENT
Start: 2023-06-21 | End: 2023-11-30

## 2023-06-25 ENCOUNTER — NURSE TRIAGE (OUTPATIENT)
Dept: NURSING | Facility: CLINIC | Age: 68
End: 2023-06-25
Payer: COMMERCIAL
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
## 2023-06-26 ENCOUNTER — OFFICE VISIT (OUTPATIENT)
Dept: FAMILY MEDICINE | Facility: CLINIC | Age: 68
End: 2023-06-26
Payer: COMMERCIAL

## 2023-06-26 ENCOUNTER — ANCILLARY PROCEDURE (OUTPATIENT)
Dept: GENERAL RADIOLOGY | Facility: CLINIC | Age: 68
End: 2023-06-26
Attending: PHYSICIAN ASSISTANT
Payer: COMMERCIAL

## 2023-06-26 VITALS
SYSTOLIC BLOOD PRESSURE: 112 MMHG | BODY MASS INDEX: 26.68 KG/M2 | WEIGHT: 190.6 LBS | HEIGHT: 71 IN | DIASTOLIC BLOOD PRESSURE: 72 MMHG | HEART RATE: 77 BPM | TEMPERATURE: 97.8 F | OXYGEN SATURATION: 99 % | RESPIRATION RATE: 20 BRPM

## 2023-06-26 DIAGNOSIS — S49.92XA INJURY OF LEFT SHOULDER, INITIAL ENCOUNTER: Primary | ICD-10-CM

## 2023-06-26 DIAGNOSIS — S49.92XA INJURY OF LEFT SHOULDER, INITIAL ENCOUNTER: ICD-10-CM

## 2023-06-26 DIAGNOSIS — S43.52XA SPRAIN OF LEFT ACROMIOCLAVICULAR LIGAMENT, INITIAL ENCOUNTER: ICD-10-CM

## 2023-06-26 PROCEDURE — 99213 OFFICE O/P EST LOW 20 MIN: CPT | Performed by: PHYSICIAN ASSISTANT

## 2023-06-26 PROCEDURE — 73050 X-RAY EXAM OF SHOULDERS: CPT | Mod: TC | Performed by: RADIOLOGY

## 2023-06-26 ASSESSMENT — PAIN SCALES - GENERAL: PAINLEVEL: MILD PAIN (2)
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
## 2023-09-02 ENCOUNTER — NURSE TRIAGE (OUTPATIENT)
Dept: NURSING | Facility: CLINIC | Age: 68
End: 2023-09-02
Payer: COMMERCIAL

## 2023-09-02 NOTE — TELEPHONE ENCOUNTER
Nurse Triage SBAR    Is this a 2nd Level Triage? NO    Situation: Urinary problem    Background: Feels like bladder is full, attempts to urinate but very little comes out, started about 2:00 AM. Has a history of kidney stone.     Assessment: Denies pain. Feels like bladder is full, unable to pass urine. Has had 32 ounces of water since 0400 with no urine output. Usually pees immediately upon waking, has not peed since last evening. Afebrile. Few drops patient was able to pass was absent of blood and was not dark in appearance.     Protocol Recommended Disposition:   Go to ED Now    Recommendation: Patient will go to the nearest emergency department, his wife will drive.      Funmilayo Sol RN on 9/2/2023 at 6:47 AM      Reason for Disposition   [1] Unable to urinate (or only a few drops) > 4 hours AND [2] bladder feels very full (e.g., palpable bladder or strong urge to urinate)    Additional Information   Negative: Shock suspected (e.g., cold/pale/clammy skin, too weak to stand, low BP, rapid pulse)   Negative: Sounds like a life-threatening emergency to the triager   Negative: Followed a female genital area injury (e.g., labia, vagina, vulva)   Negative: Followed a male genital area injury (e.g., penis, scrotum)   Negative: Vaginal discharge   Negative: Pus (white, yellow) or bloody discharge from end of penis   Negative: [1] Taking antibiotic for urinary tract infection (UTI) AND [2] female   Negative: [1] Taking antibiotic for urinary tract infection (UTI) AND [2] male   Negative: [1] Pain or burning with passing urine (urination) AND [2] pregnant   Negative: [1] Pain or burning with passing urine (urination) AND [2] postpartum (from 0 to 6 weeks after delivery)   Negative: [1] Pain or burning with passing urine (urination) AND [2] female   Negative: [1] Pain or burning with passing urine (urination) AND [2] male   Negative: Pain or itching in the vulvar area   Negative: Pain in scrotum is main symptom    Negative: Blood in the urine is main symptom   Negative: Symptoms arising from use of a urinary catheter (e.g., Coude, Martinez)    Protocols used: Urinary Symptoms-A-AH

## 2023-10-22 ENCOUNTER — NURSE TRIAGE (OUTPATIENT)
Dept: NURSING | Facility: CLINIC | Age: 68
End: 2023-10-22
Payer: COMMERCIAL

## 2023-10-23 ENCOUNTER — TELEPHONE (OUTPATIENT)
Dept: UROLOGY | Facility: CLINIC | Age: 68
End: 2023-10-23
Payer: COMMERCIAL

## 2023-10-23 DIAGNOSIS — C61 PROSTATE CANCER (H): Primary | ICD-10-CM

## 2023-10-23 PROCEDURE — 51798 US URINE CAPACITY MEASURE: CPT | Performed by: UROLOGY

## 2023-10-23 NOTE — TELEPHONE ENCOUNTER
Reporting he has a new urinary leg bag catheter he had placed three hours ago.  Drove home for two hours and had to stop to empty the bag on his way home.  He is concerned the bag will fill during his sleep.    During triaging, writer asked patient what facility he had this done at, and patient said he was in [place].  Writer unable to complete triage and recommended patient call a local hospital or clinic to reach after-hours local triage nurse.  Patient verbalized understanding.    Sunni Greco RN  [facility]    Additional Information   Negative: Shock suspected (e.g., cold/pale/clammy skin, too weak to stand, low BP, rapid pulse)   Negative: Sounds like a life-threatening emergency to the triager   Negative: [1] Catheter was accidentally pulled-out AND [2] bright red continuous bleeding (or trickling)   Negative: SEVERE abdominal pain   Negative: Fever > 100.4 F (38.0 C)   Negative: [1] Drinking very little AND [2] dehydration suspected (e.g., no urine > 12 hours, very dry mouth, very lightheaded)   Negative: Patient sounds very sick or weak to the triager   Negative: Catheter was accidentally pulled-out   Negative: [1] Catheter is broken AND [2] is not working (does not function normally)   Negative: Bleeding around catheter (e.g., from penis or female urethra)   Negative: New-onset MILD-MODERATE lower abdominal pain or swelling (distention)   Negative: [1] No urine in bag > 4 hours AND [2] catheter is not kinked    Protocols used: Urinary Catheter (e.g., Martinez) Symptoms and [id number]

## 2023-10-23 NOTE — CONFIDENTIAL NOTE
Spoke with patient regarding s/p catheter placement.  Patient of Dr. Champion had catheter placed in [place] and it began to leak.  Then he had a second catheter in [place], which also is leaking, but not as much.  Patient stated that he has a burning sensation when the urine begins to flow into the collection bag.  He also noted some blood in urine.  The patient wants to be scheduled for a follow-up with an ALPA and a TOV and catheter removal.  This author's direct line provided.    James Parekh, RN  RN Care Coordinator - Urology

## 2023-10-27 ENCOUNTER — TELEPHONE (OUTPATIENT)
Dept: FAMILY MEDICINE | Facility: CLINIC | Age: 68
End: 2023-10-27

## 2023-10-27 NOTE — TELEPHONE ENCOUNTER
Huddled with Dr. Viera to discuss, and this patient is recommended for urology, and we're unable to do void trial, etc. in family practice clinic.  Patient does have appt next Friday, but RN will huddle with urology staff today to see if they can fit him in today.   Patient updated.     Genna Trent RN  [facility]

## 2023-10-27 NOTE — TELEPHONE ENCOUNTER
"RN reached out to patient and answered question regarding labs.   Then discussed scheduled visit today with Dr. Viera, per provider, to see if appropriate, as he's on the schedule for \"VUR test\", which urology would need to do.    Patient has history of enlarged prostate and urinary problems, ended up in the ER  in [place] at [facility] over the weekend due to this and had indwelling catheter placed.  They wanted him to wait 2 days to follow-up with his provider and have a void test. If successful, then he would be recommended for intermittent cath at home and needs education on that.   He also has follow-up appointment scheduled with urology next Friday, 11/3/23.   RN will forward this and huddle with Dr. Viera to see if appropriate, or if patient should just wait to see urology.    Genna Trent RN  [facility]    "

## 2023-10-27 NOTE — TELEPHONE ENCOUNTER
Patient is calling to check if the medications he is taking need to be held for his PSA test today or if he needs to fast. He was notified that his medications will not interfere and that he does not need to fast.    Jovita Erwin RN

## 2023-10-28 ENCOUNTER — NURSE TRIAGE (OUTPATIENT)
Dept: NURSING | Facility: CLINIC | Age: 68
End: 2023-10-28
Payer: COMMERCIAL

## 2023-10-28 NOTE — TELEPHONE ENCOUNTER
Had urinary catheter placed 6 days ago. Has prostate cancer. Was Placed due to : unable to urinate. Has had blood in urine since then.(Did not prior have blood in urine) afebrile. When feeling need to urinate, does bypass around tube.      Protocol reviewed, disposition: See HCP within 4 hours. Call back with worsening symptoms, further questions/concerns. [place] [facility] [place].     ALEXA BO RN  [facility]  10/28/2023  9:13 AM  Reason for Disposition   [1] Catheter is broken AND [2] is not working (does not function normally)    Additional Information   Negative: Shock suspected (e.g., cold/pale/clammy skin, too weak to stand, low BP, rapid pulse)   Negative: Sounds like a life-threatening emergency to the triager   Negative: Urinary catheter and spinal cord injury, questions about   Negative: Urinary catheter in a hospice patient   Negative: [1] Catheter was accidentally pulled-out AND [2] bright red continuous bleeding (or trickling)   Negative: SEVERE abdominal pain   Negative: Fever > 100.4 F (38.0 C)   Negative: [1] Drinking very little AND [2] dehydration suspected (e.g., no urine > 12 hours, very dry mouth, very lightheaded)   Negative: Patient sounds very sick or weak to the triager   Negative: Catheter was accidentally pulled-out    Protocols used: Urinary Catheter (e.g., Martinez) Symptoms and [id number]-M-CV

## 2023-10-30 ENCOUNTER — LAB (OUTPATIENT)
Dept: LAB | Facility: CLINIC | Age: 68
End: 2023-10-30
Payer: COMMERCIAL

## 2023-10-30 DIAGNOSIS — C61 PROSTATE CANCER (H): ICD-10-CM

## 2023-10-30 LAB — PSA SERPL DL<=0.01 NG/ML-MCNC: 10.9 NG/ML (ref 0–4.5)

## 2023-10-30 PROCEDURE — 84153 ASSAY OF PSA TOTAL: CPT

## 2023-10-30 PROCEDURE — 36415 COLL VENOUS BLD VENIPUNCTURE: CPT

## 2023-10-30 NOTE — TELEPHONE ENCOUNTER
[facility] Call Center    Phone Message    May a detailed message be left on voicemail: yes     Reason for Call: Other: Pt called and would like to change his apt from Friday 11/3 to today - writer unable to find an apt today - please call pt to further discuss, thanks!     Action Taken: Message routed to:  [facility] ([facility] Uro    Travel Screening: Not Applicable

## 2023-10-31 ENCOUNTER — OFFICE VISIT (OUTPATIENT)
Dept: UROLOGY | Facility: CLINIC | Age: 68
End: 2023-10-31
Payer: COMMERCIAL

## 2023-10-31 DIAGNOSIS — N52.31 ERECTILE DYSFUNCTION AFTER RADICAL PROSTATECTOMY: Primary | ICD-10-CM

## 2023-10-31 PROCEDURE — 99211 OFF/OP EST MAY X REQ PHY/QHP: CPT

## 2023-10-31 NOTE — PROGRESS NOTES
Karlos Coleman comes into clinic today at the request of Dr Champion  Ordering Provider for TOV (trial of void).    Approx 75 cc of  Normal Saline  instilled into the bladder via catheter/syringe.  Catheter then removed with out difficulty   Patient voided approx 50 cc's of urine.  Patient tolerated procedure well   Teaching done with patient verbally as to where to go or call  if unable to urinate post catheter removal.  Patient was taught how to self catheterize today. He catheterized for 20 mL. Patient understands he needs to catheterize if he is unable to urinate.  Patient discussed his PSA result which is elevated at 10.90.   Patient did have montgomery in at the time. He will repeat since the montgomery is out and this writer will schedule patient an appointment with Dr Champion to see what the next steps are.    This service provided today was under the supervising provider of the day Dr ALEX Swan, who was available if needed.    Valeria Canales, RN, BSN

## 2023-11-24 ENCOUNTER — LAB (OUTPATIENT)
Dept: LAB | Facility: CLINIC | Age: 68
End: 2023-11-24
Payer: COMMERCIAL

## 2023-11-24 DIAGNOSIS — Z13.6 CARDIOVASCULAR SCREENING; LDL GOAL LESS THAN 160: ICD-10-CM

## 2023-11-24 DIAGNOSIS — N52.31 ERECTILE DYSFUNCTION AFTER RADICAL PROSTATECTOMY: ICD-10-CM

## 2023-11-24 LAB
ALT SERPL W P-5'-P-CCNC: 19 U/L (ref 0–70)
CHOLEST SERPL-MCNC: 190 MG/DL
HDLC SERPL-MCNC: 46 MG/DL
LDLC SERPL CALC-MCNC: 126 MG/DL
NONHDLC SERPL-MCNC: 144 MG/DL
PSA SERPL DL<=0.01 NG/ML-MCNC: 5.69 NG/ML (ref 0–4.5)
TRIGL SERPL-MCNC: 91 MG/DL

## 2023-11-24 PROCEDURE — 84460 ALANINE AMINO (ALT) (SGPT): CPT

## 2023-11-24 PROCEDURE — 80061 LIPID PANEL: CPT

## 2023-11-24 PROCEDURE — 84153 ASSAY OF PSA TOTAL: CPT | Mod: GZ

## 2023-11-24 PROCEDURE — 36415 COLL VENOUS BLD VENIPUNCTURE: CPT

## 2023-11-27 ENCOUNTER — VIRTUAL VISIT (OUTPATIENT)
Dept: UROLOGY | Facility: CLINIC | Age: 68
End: 2023-11-27
Payer: COMMERCIAL

## 2023-11-27 ENCOUNTER — TELEPHONE (OUTPATIENT)
Dept: UROLOGY | Facility: CLINIC | Age: 68
End: 2023-11-27

## 2023-11-27 VITALS — WEIGHT: 190 LBS | BODY MASS INDEX: 26.5 KG/M2

## 2023-11-27 DIAGNOSIS — C61 PROSTATE CANCER (H): ICD-10-CM

## 2023-11-27 DIAGNOSIS — N40.0 BENIGN PROSTATIC HYPERPLASIA, UNSPECIFIED WHETHER LOWER URINARY TRACT SYMPTOMS PRESENT: Primary | ICD-10-CM

## 2023-11-27 PROCEDURE — 99214 OFFICE O/P EST MOD 30 MIN: CPT | Mod: VID | Performed by: UROLOGY

## 2023-11-27 RX ORDER — TAMSULOSIN HYDROCHLORIDE 0.4 MG/1
0.4 CAPSULE ORAL DAILY
Qty: 90 CAPSULE | Refills: 3 | Status: SHIPPED | OUTPATIENT
Start: 2023-11-27 | End: 2024-05-30

## 2023-11-27 NOTE — PROGRESS NOTES
Virtual Visit Details    Type of service:  Video Visit   Video Start Time: 12:31 PM    CC prostate cancer      HPI:Karlos Coleman is a 68 year old male with a history of oropharyngeal cancer s/p chemotherapy and radiation, BPH, and Economy 3+3= 6 prostate cancer.  His only prostate biopsy was at [facility] on 11/30/2017 PSA showing Amanda 3+3= 6 in the left lateral base with 40% tissue involvement.  At that time was 5.1 ng/mL.  He had a surveillance biopsy on 8/15/22 showing Gl 3+3=6 in 25% of 1 core from the left base.  He continued on surveillance.  PSA was then 6.67 ng/mL on 2/20/23, 10.9 ng/mL on 10/30/23.  Patient did have an episode of near retention and an episode of steph retention that required catheter placement on 10/22/23. Had another PSA recently.  Other than the episodes of retention, he notes minimal in the way of urinary symptoms.  MRI from 1/6/22 shows a prostate vol of 102 ml.  The patient is not on any medication for urination.        OBJECTIVE: PSA from 11/24/23 is 5.69 ng/mL     ASSESSMENT AND PLAN:  Over half of today's 35-minute visit was spent reviewing the chart, results and counseling the patient regarding his prostate cancer, BPH and retention.  We discussed options of continued observation vs CIC vs medication vs surgery.  The patient is interested in trying a medication.  He will perform CIC now to establish his baseline PVR and then start the tamsulosin.  We will then reconvene in 6 months to check on his symptoms and he will recheck his PVR at that time as well and make decisions about staying on the medication or not.  He will also repeat the PSA at that time.       Video End Time:12:56 PM    Originating Location (pt. Location): Home    Distant Location (provider location):  Off-site  Platform used for Video Visit: Alina

## 2023-11-27 NOTE — LETTER
11/27/2023       RE: Karlos Coleman  [address] [address]     Dear Colleague,    Thank you for referring your patient, Karlos Coleman, to the [facility] UROLOGY CLINIC [place] at [facility]. Please see a copy of my visit note below.    Virtual Visit Details    Type of service:  Video Visit   Video Start Time: 12:31 PM    CC prostate cancer      HPI:Karlos Coleman is a 68 year old male with a history of oropharyngeal cancer s/p chemotherapy and radiation, BPH, and [place] 3+3= 6 prostate cancer.  His only prostate biopsy was at [facility] on 11/30/2017 PSA showing [place] 3+3= 6 in the left lateral base with 40% tissue involvement.  At that time was 5.1 ng/mL.  He had a surveillance biopsy on 8/15/22 showing Gl 3+3=6 in 25% of 1 core from the left base.  He continued on surveillance.  PSA was then 6.67 ng/mL on 2/20/23, 10.9 ng/mL on 10/30/23.  Patient did have an episode of near retention and an episode of steph retention that required catheter placement on 10/22/23. Had another PSA recently.  Other than the episodes of retention, he notes minimal in the way of urinary symptoms.  MRI from 1/6/22 shows a prostate vol of 102 ml.  The patient is not on any medication for urination.        OBJECTIVE: PSA from 11/24/23 is 5.69 ng/mL     ASSESSMENT AND PLAN:  Over half of today's 35-minute visit was spent reviewing the chart, results and counseling the patient regarding his prostate cancer, BPH and retention.  We discussed options of continued observation vs CIC vs medication vs surgery.  The patient is interested in trying a medication.  He will perform CIC now to establish his baseline PVR and then start the tamsulosin.  We will then reconvene in 6 months to check on his symptoms and he will recheck his PVR at that time as well and make decisions about staying on the medication or not.  He will also repeat the PSA at that time.        Video End Time:12:56 PM    Originating Location (pt. Location): Home    Distant Location (provider location):  Off-site  Platform used for Video Visit: Alina      Again, thank you for allowing me to participate in the care of your patient.      Sincerely,    Lawrence Champion MD

## 2023-11-27 NOTE — NURSING NOTE
Is the patient currently in the state of [place]? YES    Visit mode:VIDEO    If the visit is dropped, the patient can be reconnected by: VIDEO VISIT: Text to cell phone:   Telephone Information:   Mobile [phone]       Will anyone else be joining the visit? NO  (If patient encounters technical issues they should call [phone]    How would you like to obtain your AVS? MyChart    Are changes needed to the allergy or medication list? No    Reason for visit: RECHECK and Video Visit    Lashell THORNTON

## 2023-12-08 ENCOUNTER — TELEPHONE (OUTPATIENT)
Dept: UROLOGY | Facility: CLINIC | Age: 68
End: 2023-12-08
Payer: COMMERCIAL

## 2023-12-08 NOTE — TELEPHONE ENCOUNTER
Pt called trying to find out if he needs flomax based on bladder testing done 11/29.  Sent "[employer]t last week with no reply

## 2024-01-16 ENCOUNTER — TELEPHONE (OUTPATIENT)
Dept: UROLOGY | Facility: CLINIC | Age: 69
End: 2024-01-16
Payer: COMMERCIAL

## 2024-01-16 NOTE — TELEPHONE ENCOUNTER
This writer spoke with pt. He noted sending in a previous baseline of only having 25cc PVR. He is not having symptoms of retention. I told him that he does not need to take the tamsulosin if he is not having symptoms. If that changes, he should reach back out to us. I reminded him of his upcoming appt on 5/30/23 for the 6 month follow-up with Hero Salgado PA-C. All pt questions were answered.     ALVINA PRATHER RN  01/16/24

## 2024-02-26 ENCOUNTER — OFFICE VISIT (OUTPATIENT)
Dept: FAMILY MEDICINE | Facility: CLINIC | Age: 69
End: 2024-02-26
Payer: COMMERCIAL

## 2024-02-26 ENCOUNTER — TELEPHONE (OUTPATIENT)
Dept: FAMILY MEDICINE | Facility: CLINIC | Age: 69
End: 2024-02-26

## 2024-02-26 VITALS
TEMPERATURE: 97.1 F | DIASTOLIC BLOOD PRESSURE: 70 MMHG | RESPIRATION RATE: 14 BRPM | HEART RATE: 73 BPM | BODY MASS INDEX: 25.94 KG/M2 | SYSTOLIC BLOOD PRESSURE: 118 MMHG | WEIGHT: 191.5 LBS | HEIGHT: 72 IN | OXYGEN SATURATION: 97 %

## 2024-02-26 DIAGNOSIS — C10.9 OROPHARYNGEAL CANCER (H): Primary | ICD-10-CM

## 2024-02-26 PROCEDURE — 99213 OFFICE O/P EST LOW 20 MIN: CPT | Performed by: FAMILY MEDICINE

## 2024-02-26 ASSESSMENT — PAIN SCALES - GENERAL: PAINLEVEL: NO PAIN (1)

## 2024-02-26 NOTE — PROGRESS NOTES
Assessment & Plan     H/oOropharyngeal cancer (H) with new full felling at back pf throat on left side.   Patient walked into clinic asking for ENT referral.    I see no mass, but does have some erythema to uvula.   - Adult ENT  Referral; Future        BMI  Estimated body mass index is 25.97 kg/m  as calculated from the following:    Height as of this encounter: 1.829 m (6').    Weight as of this encounter: 86.9 kg (191 lb 8 oz).   Weight management plan: Discussed healthy diet and exercise guidelines          Ashvin Arora is a 68 year old, presenting for the following health issues:  Mouth Problem        2/26/2024     7:29 AM   Additional Questions   Roomed by Elizabet Linder CMA     HPI       -He is concerned the cancer on his tongue is coming back. He says thee is discomfort.        Review of Systems  Constitutional, HEENT, cardiovascular, pulmonary, gi and gu systems are negative, except as otherwise noted.      Objective    /70   Pulse 73   Temp 97.1  F (36.2  C) (Tympanic)   Resp 14   Ht 1.829 m (6')   Wt 86.9 kg (191 lb 8 oz)   SpO2 97%   BMI 25.97 kg/m    Body mass index is 25.97 kg/m .  Physical Exam   GENERAL: alert and no distress  NECK: no adenopathy, no asymmetry, masses, or scars  RESP: lungs clear to auscultation - no rales, rhonchi or wheezes  CV: regular rate and rhythm, normal S1 S2, no S3 or S4, no murmur, click or rub, no peripheral edema  ABDOMEN: soft, nontender, no hepatosplenomegaly, no masses and bowel sounds normal  MS: no gross musculoskeletal defects noted, no edema  Neck post operative changes.  Has fullness jus blow left jaw line.  Feels like post op canges.           Signed Electronically by: Drew Raines MD

## 2024-03-05 NOTE — PATIENT INSTRUCTIONS
1. Please follow-up in clinic as needed   2. Please call the ENT clinic with any questions,concerns, new or worsening symptoms.    -Clinic number is [phone]   - Lidia's direct line (Dr. Rodriguez's nurse) [phone]

## 2024-03-07 ENCOUNTER — OFFICE VISIT (OUTPATIENT)
Dept: OTOLARYNGOLOGY | Facility: CLINIC | Age: 69
End: 2024-03-07
Payer: COMMERCIAL

## 2024-03-07 VITALS — BODY MASS INDEX: 25.87 KG/M2 | HEIGHT: 72 IN | WEIGHT: 191 LBS

## 2024-03-07 DIAGNOSIS — C10.9 OROPHARYNGEAL CANCER (H): ICD-10-CM

## 2024-03-07 PROCEDURE — 31575 DIAGNOSTIC LARYNGOSCOPY: CPT | Performed by: OTOLARYNGOLOGY

## 2024-03-07 PROCEDURE — 99204 OFFICE O/P NEW MOD 45 MIN: CPT | Mod: 25 | Performed by: OTOLARYNGOLOGY

## 2024-03-07 NOTE — LETTER
3/7/2024       RE: Karlos Coleman  [address] [facility] [address]     Dear Colleague,    Thank you for referring your patient, Karlos Coleman, to the [facility] [place] at [facility]. Please see a copy of my visit note below.    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      TREATMENT:  The patient received concurrent chemoradiation therapy and finished treatment on 7/10/2012.  He underwent left neck dissection for residual disease for a positive PET/CT on 10/01/2012.  Pathology report of the neck dissection shows no evidence of carcinoma.       History of Present Illness: [age]-year-old male here in the otolaryngology clinic for evaluation of left oropharyngeal discomfort.  Patient is now 12 years out from treatment for his left oropharyngeal squamous cell carcinoma.  He also tells me that his dentist was worried about a possible lesion in the soft palate.  The patient denies any new symptoms.  He denies any dysphagia no pain he is not losing weight.    MEDICATIONS:     Current Outpatient Medications   Medication Sig Dispense Refill     sildenafil (REVATIO) 20 MG tablet Take 1-5 tablets as needed for sexual activity 90 tablet 11     tamsulosin (FLOMAX) 0.4 MG capsule Take 1 capsule (0.4 mg) by mouth daily (Patient not taking: Reported on 2/26/2024) 90 capsule 3       ALLERGIES:    Allergies   Allergen Reactions     Vicodin [Hydrocodone-Acetaminophen] Other (See Comments)     Patient gets headache with Vicodin         PAST MEDICAL HISTORY:   Past Medical History:   Diagnosis Date     Cancer of tongue (H)     s/p chemo&rad rx        FAMILY HISTORY:    Family History   Problem Relation Age of Onset     Diabetes Brother         aunt, grandmother     Allergies Mother      Obesity Brother      Psychotic Disorder Sister      Mental Illness Sister         bipolar     Cancer Father          prostate cancer     Diabetes Maternal Grandmother      Hearing Loss No family hx of        REVIEW OF SYSTEMS:  12 point ROS was negative other than the symptoms noted above in the HPI.  PHYSICAL EXAMINATION:      Constitutional:  The patient was unaccompanied, well-groomed, and in no acute distress.     Skin: Normal:  warm and pink without rash   Neurologic: Alert and oriented x 3.  CN's III-XII within normal limits.  Voice normal.    Psychiatric: The patient's affect was calm, cooperative, and appropriate.     Communication:  Normal; communicates verbally, normal voice quality.   Respiratory: Breathing comfortably without stridor or exertion of accessory muscles.    Head/Face:  Normocephalic and atraumatic.  No lesions or scars. No sinus tenderness.    Salivary glands -  Normal size, no tenderness, swelling, or palpable masses   Eyes: Pupils were equal and reactive.  Extraocular movement intact.     Ears: Pinnae and tragus non-tender.  EAC's and TM's were clear.      Nose: Sinuses were non-tender.  Anterior rhinoscopy revealed midline septum and absence of purulence or polyps.     Oral Cavity: Normal tongue, floor of mouth, buccal mucosa, and palate.  No lesions or masses on inspection or palpation.     Oropharynx: Normal mucosa, palate symmetric with normal elevation. No abnormal lymph tissue in the oropharynx.             Neck: Supple with normal laryngeal and tracheal landmarks.  The parotid beds were without masses.  No palpable thyroid.  Normal range of motion   Lymphatic: There is no palpable lymphadenopathy in the neck.              Fiberoptic Endoscopy:  Consent for fiberoptic laryngoscopy was obtained, and we confirmed correctness of procedure and identity of patient.  Fiberoptic laryngoscopy was indicated due to history of right oropharyngeal carcinoma.  The nose was topically decongested and anesthetized.  The fiberoptic laryngoscope was passed under endoscopic vision.  The turbinates were normal.  The  right inferior and middle meati was clear without purulence, masses, or polyps.  The nasopharynx was clear.  The Eustachian tubes were clear.  The soft palate appeared normal with good mobility.  The epiglottis was sharp and the visualized portion of the vallecula was clear.  The larynx was clear with mobile cords.  The arytenoids were clear and there was no pooling in the hypopharynx.  Telangiectatics all through the upper respiratory tract most likely due to prior radiation therapy         IMPRESSION AND PLAN: 68-year-old male with history of left oropharyngeal squamous cell carcinoma he is now 12 years from treatment.  Today's examination and flexible scope did not show any evidence of recurrence masses or lesions of concern.  I reassured the patient that the examination was negative.      Usha Rojas MD, M.S.  Otolaryngology- Head & Neck Surgery  [phone]           Again, thank you for allowing me to participate in the care of your patient.      Sincerely,    Usha Rojas MD

## 2024-03-07 NOTE — PROGRESS NOTES
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      TREATMENT:  The patient received concurrent chemoradiation therapy and finished treatment on 7/10/2012.  He underwent left neck dissection for residual disease for a positive PET/CT on 10/01/2012.  Pathology report of the neck dissection shows no evidence of carcinoma.       History of Present Illness: [age]-year-old male here in the otolaryngology clinic for evaluation of left oropharyngeal discomfort.  Patient is now 12 years out from treatment for his left oropharyngeal squamous cell carcinoma.  He also tells me that his dentist was worried about a possible lesion in the soft palate.  The patient denies any new symptoms.  He denies any dysphagia no pain he is not losing weight.    MEDICATIONS:     Current Outpatient Medications   Medication Sig Dispense Refill    sildenafil (REVATIO) 20 MG tablet Take 1-5 tablets as needed for sexual activity 90 tablet 11    tamsulosin (FLOMAX) 0.4 MG capsule Take 1 capsule (0.4 mg) by mouth daily (Patient not taking: Reported on 2/26/2024) 90 capsule 3       ALLERGIES:    Allergies   Allergen Reactions    Vicodin [Hydrocodone-Acetaminophen] Other (See Comments)     Patient gets headache with Vicodin         PAST MEDICAL HISTORY:   Past Medical History:   Diagnosis Date    Cancer of tongue (H)     s/p chemo&rad rx        FAMILY HISTORY:    Family History   Problem Relation Age of Onset    Diabetes Brother         aunt, grandmother    Allergies Mother     Obesity Brother     Psychotic Disorder Sister     Mental Illness Sister         bipolar    Cancer Father         prostate cancer    Diabetes Maternal Grandmother     Hearing Loss No family hx of        REVIEW OF SYSTEMS:  12 point ROS was negative other than the symptoms noted above in the HPI.  PHYSICAL EXAMINATION:      Constitutional:  The patient was unaccompanied, well-groomed, and in no acute distress.     Skin: Normal:  warm and pink  without rash   Neurologic: Alert and oriented x 3.  CN's III-XII within normal limits.  Voice normal.    Psychiatric: The patient's affect was calm, cooperative, and appropriate.     Communication:  Normal; communicates verbally, normal voice quality.   Respiratory: Breathing comfortably without stridor or exertion of accessory muscles.    Head/Face:  Normocephalic and atraumatic.  No lesions or scars. No sinus tenderness.    Salivary glands -  Normal size, no tenderness, swelling, or palpable masses   Eyes: Pupils were equal and reactive.  Extraocular movement intact.     Ears: Pinnae and tragus non-tender.  EAC's and TM's were clear.      Nose: Sinuses were non-tender.  Anterior rhinoscopy revealed midline septum and absence of purulence or polyps.     Oral Cavity: Normal tongue, floor of mouth, buccal mucosa, and palate.  No lesions or masses on inspection or palpation.     Oropharynx: Normal mucosa, palate symmetric with normal elevation. No abnormal lymph tissue in the oropharynx.             Neck: Supple with normal laryngeal and tracheal landmarks.  The parotid beds were without masses.  No palpable thyroid.  Normal range of motion   Lymphatic: There is no palpable lymphadenopathy in the neck.              Fiberoptic Endoscopy:  Consent for fiberoptic laryngoscopy was obtained, and we confirmed correctness of procedure and identity of patient.  Fiberoptic laryngoscopy was indicated due to history of right oropharyngeal carcinoma.  The nose was topically decongested and anesthetized.  The fiberoptic laryngoscope was passed under endoscopic vision.  The turbinates were normal.  The right inferior and middle meati was clear without purulence, masses, or polyps.  The nasopharynx was clear.  The Eustachian tubes were clear.  The soft palate appeared normal with good mobility.  The epiglottis was sharp and the visualized portion of the vallecula was clear.  The larynx was clear with mobile cords.  The arytenoids  were clear and there was no pooling in the hypopharynx.  Telangiectatics all through the upper respiratory tract most likely due to prior radiation therapy         IMPRESSION AND PLAN: 68-year-old male with history of left oropharyngeal squamous cell carcinoma he is now 12 years from treatment.  Today's examination and flexible scope did not show any evidence of recurrence masses or lesions of concern.  I reassured the patient that the examination was negative.      Usha Rojas MD, M.S.  Otolaryngology- Head & Neck Surgery  [phone]

## 2024-03-18 ENCOUNTER — OFFICE VISIT (OUTPATIENT)
Dept: FAMILY MEDICINE | Facility: CLINIC | Age: 69
End: 2024-03-18
Payer: COMMERCIAL

## 2024-03-18 VITALS
WEIGHT: 195.9 LBS | HEIGHT: 72 IN | SYSTOLIC BLOOD PRESSURE: 118 MMHG | BODY MASS INDEX: 26.53 KG/M2 | OXYGEN SATURATION: 96 % | DIASTOLIC BLOOD PRESSURE: 80 MMHG | TEMPERATURE: 97.4 F | RESPIRATION RATE: 16 BRPM | HEART RATE: 61 BPM

## 2024-03-18 DIAGNOSIS — K14.6 SORENESS OF TONGUE: ICD-10-CM

## 2024-03-18 DIAGNOSIS — Z00.00 ENCOUNTER FOR MEDICARE ANNUAL WELLNESS EXAM: Primary | ICD-10-CM

## 2024-03-18 DIAGNOSIS — Z29.11 NEED FOR VACCINATION AGAINST RESPIRATORY SYNCYTIAL VIRUS: ICD-10-CM

## 2024-03-18 LAB
BASOPHILS # BLD AUTO: 0 10E3/UL (ref 0–0.2)
BASOPHILS NFR BLD AUTO: 0 %
EOSINOPHIL # BLD AUTO: 0.4 10E3/UL (ref 0–0.7)
EOSINOPHIL NFR BLD AUTO: 8 %
ERYTHROCYTE [DISTWIDTH] IN BLOOD BY AUTOMATED COUNT: 12.2 % (ref 10–15)
HCT VFR BLD AUTO: 45 % (ref 40–53)
HGB BLD-MCNC: 15.1 G/DL (ref 13.3–17.7)
IMM GRANULOCYTES # BLD: 0 10E3/UL
IMM GRANULOCYTES NFR BLD: 0 %
LYMPHOCYTES # BLD AUTO: 1.2 10E3/UL (ref 0.8–5.3)
LYMPHOCYTES NFR BLD AUTO: 26 %
MCH RBC QN AUTO: 30.4 PG (ref 26.5–33)
MCHC RBC AUTO-ENTMCNC: 33.6 G/DL (ref 31.5–36.5)
MCV RBC AUTO: 91 FL (ref 78–100)
MONOCYTES # BLD AUTO: 0.4 10E3/UL (ref 0–1.3)
MONOCYTES NFR BLD AUTO: 9 %
NEUTROPHILS # BLD AUTO: 2.7 10E3/UL (ref 1.6–8.3)
NEUTROPHILS NFR BLD AUTO: 57 %
PLATELET # BLD AUTO: 182 10E3/UL (ref 150–450)
RBC # BLD AUTO: 4.97 10E6/UL (ref 4.4–5.9)
WBC # BLD AUTO: 4.7 10E3/UL (ref 4–11)

## 2024-03-18 PROCEDURE — 85025 COMPLETE CBC W/AUTO DIFF WBC: CPT | Performed by: FAMILY MEDICINE

## 2024-03-18 PROCEDURE — G0439 PPPS, SUBSEQ VISIT: HCPCS | Performed by: FAMILY MEDICINE

## 2024-03-18 PROCEDURE — 36415 COLL VENOUS BLD VENIPUNCTURE: CPT | Performed by: FAMILY MEDICINE

## 2024-03-18 RX ORDER — RESPIRATORY SYNCYTIAL VIRUS VACCINE 120MCG/0.5
0.5 KIT INTRAMUSCULAR ONCE
Qty: 1 EACH | Refills: 0 | Status: CANCELLED | OUTPATIENT
Start: 2024-03-18 | End: 2024-03-18

## 2024-03-18 SDOH — HEALTH STABILITY: PHYSICAL HEALTH: ON AVERAGE, HOW MANY DAYS PER WEEK DO YOU ENGAGE IN MODERATE TO STRENUOUS EXERCISE (LIKE A BRISK WALK)?: 6 DAYS

## 2024-03-18 SDOH — HEALTH STABILITY: PHYSICAL HEALTH: ON AVERAGE, HOW MANY MINUTES DO YOU ENGAGE IN EXERCISE AT THIS LEVEL?: 30 MIN

## 2024-03-18 ASSESSMENT — SOCIAL DETERMINANTS OF HEALTH (SDOH): HOW OFTEN DO YOU GET TOGETHER WITH FRIENDS OR RELATIVES?: ONCE A WEEK

## 2024-03-18 ASSESSMENT — PAIN SCALES - GENERAL: PAINLEVEL: NO PAIN (1)

## 2024-03-18 NOTE — PATIENT INSTRUCTIONS
Chidi Murillo's direct line (Dr. Rodriguez's nurse) [phone]    Preventive Care Advice   This is general advice given by our system to help you stay healthy. However, your care team may have specific advice just for you. Please talk to your care team about your preventive care needs.  Nutrition  Eat 5 or more servings of fruits and vegetables each day.  Try wheat bread, brown rice and whole grain pasta (instead of white bread, rice, and pasta).  Get enough calcium and vitamin D. Check the label on foods and aim for 100% of the RDA (recommended daily allowance).  Lifestyle  Exercise at least 150 minutes each week   (30 minutes a day, 5 days a week).  Do muscle strengthening activities 2 days a week. These help control your weight and prevent disease.  No smoking.  Wear sunscreen to prevent skin cancer.  Have a dental exam and cleaning every 6 months.  Yearly exams  See your health care team every year to talk about:  Any changes in your health.  Any medicines your care team has prescribed.  Preventive care, family planning, and ways to prevent chronic diseases.  Shots (vaccines)   HPV shots (up to age 26), if you've never had them before.  Hepatitis B shots (up to age 59), if you've never had them before.  COVID-19 shot: Get this shot when it's due.  Flu shot: Get a flu shot every year.  Tetanus shot: Get a tetanus shot every 10 years.  Pneumococcal, hepatitis A, and RSV shots: Ask your care team if you need these based on your risk.  Shingles shot (for age 50 and up).  General health tests  Diabetes screening:  Starting at age 35, Get screened for diabetes at least every 3 years.  If you are younger than age 35, ask your care team if you should be screened for diabetes.  Cholesterol test: At age 39, start having a cholesterol test every 5 years, or more often if advised.  Bone density scan (DEXA): At age 50, ask your care team if you should have this scan for osteoporosis (brittle bones).  Hepatitis C: Get tested at  least once in your life.  STIs (sexually transmitted infections)  Before age 24: Ask your care team if you should be screened for STIs.  After age 24: Get screened for STIs if you're at risk. You are at risk for STIs (including HIV) if:  You are sexually active with more than one person.  You don't use condoms every time.  You or a partner was diagnosed with a sexually transmitted infection.  If you are at risk for HIV, ask about PrEP medicine to prevent HIV.  Get tested for HIV at least once in your life, whether you are at risk for HIV or not.  Cancer screening tests  Cervical cancer screening: If you have a cervix, begin getting regular cervical cancer screening tests at age 21. Most people who have regular screenings with normal results can stop after age 65. Talk about this with your provider.  Breast cancer scan (mammogram): If you've ever had breasts, begin having regular mammograms starting at age 40. This is a scan to check for breast cancer.  Colon cancer screening: It is important to start screening for colon cancer at age 45.  Have a colonoscopy test every 10 years (or more often if you're at risk) Or, ask your provider about stool tests like a FIT test every year or Cologuard test every 3 years.  To learn more about your testing options, visit: https://www.[employer]/043444.pdf.  For help making a decision, visit: https://bit.ly/em00633.  Prostate cancer screening test: If you have a prostate and are age 55 to 69, ask your provider if you would benefit from a yearly prostate cancer screening test.  Lung cancer screening: If you are a current or former smoker age 50 to 80, ask your care team if ongoing lung cancer screenings are right for you.  For informational purposes only. Not to replace the advice of your health care provider. Copyright   2023 [employer]. All rights reserved. Clinically reviewed by the [facility] Transitions Program. [employer] [id number] - REV 01/24.

## 2024-03-18 NOTE — PROGRESS NOTES
Preventive Care Visit  [facility] SOPHIE Raines MD, Family Practice  Mar 18, 2024  {Provider  Link to SmartSet :014321}    {PROVIDER CHARTING PREFERENCE:772726}    Ashvin Arora is a 68 year old, presenting for the following:  Mouth Problem (Spot on tongue) and Medicare Visit        3/18/2024     3:42 PM   Additional Questions   Roomed by Elizabet Burrell CMA     {ROOMER if patient is in their first year of Medicare a vision screen is required click here to document the Vison screen and then refresh the note to pull in results  :916596}    Health Care Directive  Patient does not have a Health Care Directive or Living Will: Discussed advance care planning with patient; however, patient declined at this time.    -He is also wanting his tongue looked at again. He says it feels like the sore is still on his tongue. He says he was seen by ENT.    {MA/LPN/RN Pre-Provider Visit Orders- hCG/UA/Strep (Optional):788083}  {SUPERLIST (Optional):255065}  {additonal problems for provider to add (Optional):509424}      3/18/2024   General Health   How would you rate your overall physical health? Good   Feel stress (tense, anxious, or unable to sleep) Not at all         3/18/2024   Nutrition   Diet: Low fat/cholesterol         3/18/2024   Exercise   Days per week of moderate/strenous exercise 6 days   Average minutes spent exercising at this level 30 min         3/18/2024   Social Factors   Frequency of gathering with friends or relatives Once a week   Worry food won't last until get money to buy more No   Food not last or not have enough money for food? No   Do you have housing?  Yes   Are you worried about losing your housing? No   Lack of transportation? No   Unable to get utilities (heat,electricity)? No         4/17/2023   Fall Risk   Fallen 2 or more times in the past year? No          3/18/2024   Activities of Daily Living- Home Safety   Needs help with the following daily activites None of the above    Safety concerns in the home None of the above         3/18/2024   Dental   Dentist two times every year? (!) NO         3/18/2024   Hearing Screening   Hearing concerns? None of the above         3/18/2024   Driving Risk Screening   Patient/family members have concerns about driving No         3/18/2024   General Alertness/Fatigue Screening   Have you been more tired than usual lately? No         3/18/2024   Urinary Incontinence Screening   Bothered by leaking urine in past 6 months No         3/18/2024   TB Screening   Were you born outside of the [place]? No         Today's PHQ-2 Score:       3/18/2024     3:54 PM   PHQ-2 ( 1999 Pf[employer])   Q1: Little interest or pleasure in doing things 0   Q2: Feeling down, depressed or hopeless 0   PHQ-2 Score 0   Q1: Little interest or pleasure in doing things Not at all   Q2: Feeling down, depressed or hopeless Not at all   PHQ-2 Score 0           3/18/2024   Substance Use   Alcohol more than 3/day or more than 7/wk Not Applicable   Do you have a current opioid prescription? No   How severe/bad is pain from 1 to 10? 1/10   Do you use any other substances recreationally? No     Social History     Tobacco Use    Smoking status: Never    Smokeless tobacco: Never   Vaping Use    Vaping Use: Never used   Substance Use Topics    Alcohol use: No     Comment: none    Drug use: No     {Provider  If there are gaps in the social history shown above, please follow the link to update and then refresh the note Link to Social and Substance History :115863}      3/18/2024   AAA Screening   Family history of Abdominal Aortic Aneurysm (AAA)? No   Last PSA:   PSA   Date Value Ref Range Status   08/13/2020 6.04 (H) 0 - 4 ug/L Final     Comment:     Assay Method:  Chemiluminescence using Siemens Vista analyzer     Prostate Specific Antigen Screen   Date Value Ref Range Status   12/02/2021 7.11 (H) 0.00 - 4.00 ug/L Final     PSA Tumor Marker   Date Value Ref Range Status   11/24/2023 5.69 (H) 0.00 -  4.50 ng/mL Final   02/20/2023 6.67 (H) 0.00 - 4.00 ug/L Final     ASCVD Risk   The 10-year ASCVD risk score (Coleen ISAAC, et al., 2019) is: 12.1%    Values used to calculate the score:      Age: 68 years      Sex: Male      Is Non- : No      Diabetic: No      Tobacco smoker: No      Systolic Blood Pressure: 118 mmHg      Is BP treated: No      HDL Cholesterol: 46 mg/dL      Total Cholesterol: 190 mg/dL    {Link to Fracture Risk Assessment Tool (Optional):797588}    {Provider  Use the storyboard to review patient history, after sections have been marked as reviewed, refresh note to capture documentation:456304}  {Provider   REQUIRED AWV use this link to review and update sexual activity history  after section has been marked as reviewed, refresh note to capture documentation:870983}  Reviewed and updated as needed this visit by Provider                    {HISTORY OPTIONS (Optional):859428}  Current providers sharing in care for this patient include:  Patient Care Team:  Drew Raines MD as PCP - General (Family Practice)  Usha Rojas MD as MD (Otolaryngology)  Drew Raines MD as MD (Family Practice)  Drew Raines MD as Assigned PCP  Lawrence Champion MD as Assigned Surgical Provider    The following health maintenance items are reviewed in Epic and correct as of today:  Health Maintenance   Topic Date Due    RSV VACCINE (Pregnancy & 60+) (1 - 1-dose 60+ series) Never done    GLUCOSE  08/13/2023    INFLUENZA VACCINE (1) Never done    ANNUAL REVIEW OF HM ORDERS  04/24/2024    FALL RISK ASSESSMENT  04/24/2024    LIPID  11/24/2024    MEDICARE ANNUAL WELLNESS VISIT  03/18/2025    ADVANCE CARE PLANNING  08/27/2025    COLORECTAL CANCER SCREENING  07/29/2031    DTAP/TDAP/TD IMMUNIZATION (4 - Td or Tdap) 11/28/2032    HEPATITIS C SCREENING  Completed    PHQ-2 (once per calendar year)  Completed    Pneumococcal Vaccine: 65+ Years  Completed    ZOSTER  IMMUNIZATION  Completed    COVID-19 Vaccine  Completed    IPV IMMUNIZATION  Aged Out    HPV IMMUNIZATION  Aged Out    MENINGITIS IMMUNIZATION  Aged Out    RSV MONOCLONAL ANTIBODY  Aged Out       {ROS Picklists (Optional):740147}     Objective    Exam  Resp 16   Ht 1.829 m (6')   Wt 88.9 kg (195 lb 14.4 oz)   BMI 26.57 kg/m     Estimated body mass index is 26.57 kg/m  as calculated from the following:    Height as of this encounter: 1.829 m (6').    Weight as of this encounter: 88.9 kg (195 lb 14.4 oz).    Physical Exam  {Exam Choices (Optional):399739}         3/18/2024   Mini Cog   Clock Draw Score 2 Normal   3 Item Recall 3 objects recalled   Mini Cog Total Score 5     {A Mini-Cog total score of 0-2 suggests the possibility of dementia, score of 3-5 suggests no dementia:076634}         Signed Electronically by: Drew Raines MD  {Email feedback regarding this note to [email or web address]   :603696}

## 2024-03-18 NOTE — PROGRESS NOTES
Assessment & Plan       (K14.6) Soreness of tongue  Comment: Reports 1 month ago noticing a mild discomfort on the left lateral posterior of his tongue. ENT consult on 3/7 with no findings. He reports wanting his white blood cells checked as he was told in the past this would helped diagnose his cancer in 2012 earlier.   Plan: CBC with platelets and differential        Will follow up on labs    (Z00.00) Encounter for Medicare annual wellness exam  (primary encounter diagnosis)  Comment: Too early for annual. Not fasting.   Plan: Will return next month    (Z29.11) Need for vaccination against respiratory syncytial virus  Comment:   Plan: Will address at annual       Subjective   Ulysses is a 68 year old, presenting for the following health issues:  Mouth Problem (Spot on tongue) and Medicare Visit        3/18/2024     3:42 PM   Additional Questions   Roomed by Elizabet Burrell CMA     History of Present Illness       Reason for visit:  Wellness check    He eats 4 or more servings of fruits and vegetables daily.He consumes 0 sweetened beverage(s) daily.He exercises with enough effort to increase his heart rate 9 or less minutes per day.  He exercises with enough effort to increase his heart rate 3 or less days per week.   He is taking medications regularly.       -He was initially here for his wellness exam but he is too early.    -He is here to let you know that he was seen in ENT and he still has the sore and it is bothering.    -Wanting to discuss certain lab work.    -He had toenail fungus on left great toe and he says that has cleared up nicely.    Patient presents reporting he is still concerned about the soreness of his tongue. He was seen here last month for this and referred to ENT. 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 ENT visit on 3/7 with scope found no area of concern. He reports this soreness is 1/10 on the posterior left lateral side of his tongue that started  "after he had an upper molar removed last month. He reports he just feels concerned about it being cancer. He says when originally diagnosed with cancer he was told that if \"they would have checked my white blood cells they would have caught it earlier.\" He is requesting for us to check his WBC. No weight loss, difficulty swallowing, or enlarged lymph nodes.   Also previously treated left great toe fungal infection has resolved and he is off the Lamisil.   No other complaints.     Review of Systems  Constitutional, HEENT, cardiovascular, pulmonary, gi and gu systems are negative, except as otherwise noted.        Objective    /80   Pulse 61   Temp 97.4  F (36.3  C) (Tympanic)   Resp 16   Ht 1.829 m (6')   Wt 88.9 kg (195 lb 14.4 oz)   SpO2 96%   BMI 26.57 kg/m    Body mass index is 26.57 kg/m .  Physical Exam  Constitutional:       Appearance: Normal appearance.   HENT:      Head: Normocephalic and atraumatic.      Right Ear: Tympanic membrane, ear canal and external ear normal.      Left Ear: Tympanic membrane, ear canal and external ear normal.      Nose: Nose normal.      Mouth/Throat:      Comments: Left posterior pharynx slight erythema and telangiectasias (present since treatment for oropharyngeal cancer)  Eyes:      Extraocular Movements: Extraocular movements intact.      Conjunctiva/sclera: Conjunctivae normal.      Pupils: Pupils are equal, round, and reactive to light.   Cardiovascular:      Rate and Rhythm: Normal rate and regular rhythm.      Pulses: Normal pulses.      Heart sounds: Normal heart sounds.   Abdominal:      General: Bowel sounds are normal.      Palpations: Abdomen is soft.   Musculoskeletal:         General: Normal range of motion.      Cervical back: Normal range of motion.   Skin:     General: Skin is warm.      Capillary Refill: Capillary refill takes less than 2 seconds.      Comments: Scarring behind left ear from previous lymph node removal.   Teleangiectasis along left " lateral side of neck.   Neurological:      Mental Status: He is alert and oriented to person, place, and time.   Psychiatric:         Mood and Affect: Mood normal.         Behavior: Behavior normal.         Thought Content: Thought content normal.         Judgment: Judgment normal.      ENMA Pisano student       I, Drew Raines MD, was present with the medical/ALPA student who participated in the service and in the documentation of the note.  I have verified the history and personally performed the physical exam and medical decision making.  I agree with the assessment and plan of care as documented in the note.      Signed Electronically by: Drew Raines MD

## 2024-05-21 DIAGNOSIS — C61 PROSTATE CANCER (H): Primary | ICD-10-CM

## 2024-05-22 ENCOUNTER — LAB (OUTPATIENT)
Dept: LAB | Facility: CLINIC | Age: 69
End: 2024-05-22
Payer: COMMERCIAL

## 2024-05-22 DIAGNOSIS — C61 PROSTATE CANCER (H): ICD-10-CM

## 2024-05-22 LAB — PSA SERPL DL<=0.01 NG/ML-MCNC: 6.3 NG/ML (ref 0–4.5)

## 2024-05-22 PROCEDURE — 84153 ASSAY OF PSA TOTAL: CPT

## 2024-05-22 PROCEDURE — 36415 COLL VENOUS BLD VENIPUNCTURE: CPT

## 2024-05-29 NOTE — PROGRESS NOTES
Visit conducted via real-time audio/video technology by Christiano Salgado PA-C to the patient in their home.     Subjective      REASON FOR VISIT  Prostate cancer on active surveillance follow-up    HISTORY OF PRESENT ILLNESS  Mr. Coleman is a 69 year old male who I am speaking with today in follow-up for his history of [place] 6 prostate cancer currently on active surveillance.  His initial diagnosis was in 2017, with surveillance biopsy on 8/15/2022.    His other urologic history is significant for a isolated episode of urinary retention with subsequent resolution now not taking Flomax as well as erectile dysfunction currently with a prescription for sildenafil that he does not need to use consistently.    Today:  No new concerns from an erectile dysfunction perspective  Continues to deny any issues with retention and is not taking the Flomax based on recommendations from Dr. Champion    Objective      PHYSICAL EXAMINATION  Deferred given virtual visit.    LABS  Lab Results   Component Value Date    PSA 6.30 (H) 05/22/2024    PSA 5.69 (H) 11/24/2023    PSA 10.90 (H) 10/30/2023    PSA 6.67 (H) 02/20/2023    PSA 7.11 (H) 12/02/2021    PSA 6.04 (H) 08/13/2020    PSA 3.32 10/01/2014      Most up to date biopsy from 8/15/22:     Final Diagnosis   A. Prostate, left base, needle biopsy:  - Prostate adenocarcinoma, acinar type  - Amanda score 6 (3+3)  - Grade group 1  - Extent: 1 of 2 cores (2.5 mm, 20%)  - High grade prostatic intraepithelial neoplasia     B. Prostate, left mid, needle biopsy:  - Benign prostate tissue      C. Prostate, left apex, needle biopsy:  - Benign prostate tissue      D. Prostate, right base, needle biopsy:  - Benign prostate tissue      E. Prostate, right mid, needle biopsy:  - Benign prostate tissue      F. Prostate, right apex, needle biopsy:  - Benign prostate tissue      G. Prostate, left transition zone, needle biopsy:  - Benign prostate tissue      H. Prostate, right transition zone,  needle biopsy:  - Benign prostate tissue      IMAGING  Most up-to-date prostate MRI    Narrative & Impression   MRI PROSTATE: 1/6/2022 10:04 AM     CLINICAL HISTORY: Elevated prostate specific antigen (PSA)     Most Recent PSA: 7.11 ug/L on 12/2/2021     Comparison: None.     TECHNIQUE:  The following sequences were obtained: High-resolution axial  T2-weighted, coronal T2-weighted, 3D volumetric T2-weighted, axial  pre-contrast T1, axial diffusion-weighted, axial apparent diffusion  coefficient and axial dynamic contrast-enhanced T1. Postcontrast  images were evaluated on a separate workstation to evaluate dynamic  contrast enhancement. The technique of this exam is PI-RADS v2.1  compliant. Contrast dose: 10cc of Gadavist injected.     FINDINGS:  Size: 102 grams  Hemorrhage: Absent  Peripheral zone: Heterogeneous on T2-weighted images. Regions of  mildly decreased signal on ADC or DWI which are best characterized as  PI-RADS 2 without highly suspicious lesion.  Transition zone: Enlarged with BPH changes. Transition zone nodules  which are circumscribed or mostly encapsulated without diffusion  restriction.  PI-RADS 2.  No highly suspicious nodules.     Neurovascular bundles: No neurovascular bundle involvement by  malignancy.  Seminal vesicles: No seminal vesicle involvement by malignancy.  Lymph nodes: No lymph node involvement  Bones: No suspicious lesions  Other pelvic organs: No additional findings.                                                                         IMPRESSION:  1. Based on the most suspicious abnormality, this exam is  characterized as PIRADS 2 - Clinically significant cancer is unlikely  to be present.    2. No suspicious adenopathy or evidence of pelvic metastases.     Assessment & Plan    [place] 6 prostate cancer on active surveillance  Erectile dysfunction  Previous episode of urinary retention, isolated, self resolved    Is my pleasure to speak with Ulysses in regards to his history and  current diagnosis of [place] 6 prostate cancer on active surveillance with no significant change in the last 5 years.  After reviewing his clinical history, I am happy to let him know that his PSA has returned very stable as compared to his previous values, and that at this time I see no reason to move forward with any repeat MRIs or biopsies at this time.  We discussed standard active surveillance guidelines and that my recommendation at this point would be to get a PSA no more than once a year, and we will make decisions on when to repeat an MRI or biopsy based on changes to his PSA.  Ulysses expressed understanding and agreement with this plan.    In regards to his previous episode of urinary retention, I am glad to hear he remains to be symptom-free and so we removed the Flomax from his medication list.    In regards to his erectile dysfunction, I am glad to hear that he has not had much need of his sildenafil.  He would like to keep his prescription as part of his chart, though he still admits he is not using it very frequently at the moment.    Mr. Coleman expressed understanding and agreement to the above discussion and plan and all of his questions were answered to his satisfaction.     PLAN  Repeat PSA in 1 year with follow-up virtual visit with me shortly afterwards to discuss results    SIGNED    Christiano Salgado PA-C      I spent a total of 15 minutes spent on the date of the encounter doing chart review, history and exam, documentation, and further activities as noted above.

## 2024-05-30 ENCOUNTER — TELEPHONE (OUTPATIENT)
Dept: UROLOGY | Facility: CLINIC | Age: 69
End: 2024-05-30

## 2024-05-30 ENCOUNTER — VIRTUAL VISIT (OUTPATIENT)
Dept: UROLOGY | Facility: CLINIC | Age: 69
End: 2024-05-30
Payer: COMMERCIAL

## 2024-05-30 DIAGNOSIS — C61 PROSTATE CANCER (H): Primary | ICD-10-CM

## 2024-05-30 PROCEDURE — 99213 OFFICE O/P EST LOW 20 MIN: CPT | Mod: 95 | Performed by: STUDENT IN AN ORGANIZED HEALTH CARE EDUCATION/TRAINING PROGRAM

## 2024-05-30 ASSESSMENT — PAIN SCALES - GENERAL: PAINLEVEL: NO PAIN (0)

## 2024-05-30 NOTE — TELEPHONE ENCOUNTER
Patient confirmed scheduled appointment:  Date: May 29th 2025  Time: 10:30am   Visit type: Return   Provider: Hero Salgado   Location: [facility] (Pt will be in [place])  Testing/imaging: PSA scheduled May 26th   Additional notes: Repeat PSA in one year with follow-up virtual visit with Christiano Salgado PA-C shortly afterward. - per check out

## 2024-05-30 NOTE — NURSING NOTE
Is the patient currently in the state of [place]? YES    Visit mode:VIDEO    If the visit is dropped, the patient can be reconnected by: VIDEO VISIT: Text to cell phone:   Telephone Information:   Mobile [phone]       Will anyone else be joining the visit? NO  (If patient encounters technical issues they should call [phone] :[phone])    How would you like to obtain your AVS? MyChart    Are changes needed to the allergy or medication list? No    Are refills needed on medications prescribed by this physician? NO    Reason for visit: RECHECK (6 month follow up with PSA prior/)    Shanti ANDERSONF

## 2024-05-30 NOTE — PROGRESS NOTES
Virtual Visit Details    Type of service:  Video Visit   Video Start Time:  10:17 AM  Video End Time:10:24 AM    Originating Location (pt. Location): Home    Distant Location (provider location):  Off-site  Platform used for Video Visit: Alina

## 2024-05-30 NOTE — LETTER
5/30/2024       RE: Karlos Coleman  [address] [facility] [address]     Dear Colleague,    Thank you for referring your patient, Karlos Coleman, to the [facility] [place] at [facility]. Please see a copy of my visit note below.    Visit conducted via real-time audio/video technology by Christiano Salgado PA-C to the patient in their home.     Subjective     REASON FOR VISIT  Prostate cancer on active surveillance follow-up    HISTORY OF PRESENT ILLNESS  Mr. Coleman is a 69 year old male who I am speaking with today in follow-up for his history of Amanda 6 prostate cancer currently on active surveillance.  His initial diagnosis was in 2017, with surveillance biopsy on 8/15/2022.    His other urologic history is significant for a isolated episode of urinary retention with subsequent resolution now not taking Flomax as well as erectile dysfunction currently with a prescription for sildenafil that he does not need to use consistently.    Today:  No new concerns from an erectile dysfunction perspective  Continues to deny any issues with retention and is not taking the Flomax based on recommendations from Dr. Champion    Objective     PHYSICAL EXAMINATION  Deferred given virtual visit.    LABS  Lab Results   Component Value Date    PSA 6.30 (H) 05/22/2024    PSA 5.69 (H) 11/24/2023    PSA 10.90 (H) 10/30/2023    PSA 6.67 (H) 02/20/2023    PSA 7.11 (H) 12/02/2021    PSA 6.04 (H) 08/13/2020    PSA 3.32 10/01/2014      Most up to date biopsy from 8/15/22:     Final Diagnosis   A. Prostate, left base, needle biopsy:  - Prostate adenocarcinoma, acinar type  - Amanda score 6 (3+3)  - Grade group 1  - Extent: 1 of 2 cores (2.5 mm, 20%)  - High grade prostatic intraepithelial neoplasia     B. Prostate, left mid, needle biopsy:  - Benign prostate tissue      C. Prostate, left apex, needle biopsy:  - Benign prostate tissue      D. Prostate, right  base, needle biopsy:  - Benign prostate tissue      E. Prostate, right mid, needle biopsy:  - Benign prostate tissue      F. Prostate, right apex, needle biopsy:  - Benign prostate tissue      G. Prostate, left transition zone, needle biopsy:  - Benign prostate tissue      H. Prostate, right transition zone, needle biopsy:  - Benign prostate tissue      IMAGING  Most up-to-date prostate MRI    Narrative & Impression   MRI PROSTATE: 1/6/2022 10:04 AM     CLINICAL HISTORY: Elevated prostate specific antigen (PSA)     Most Recent PSA: 7.11 ug/L on 12/2/2021     Comparison: None.     TECHNIQUE:  The following sequences were obtained: High-resolution axial  T2-weighted, coronal T2-weighted, 3D volumetric T2-weighted, axial  pre-contrast T1, axial diffusion-weighted, axial apparent diffusion  coefficient and axial dynamic contrast-enhanced T1. Postcontrast  images were evaluated on a separate workstation to evaluate dynamic  contrast enhancement. The technique of this exam is PI-RADS v2.1  compliant. Contrast dose: 10cc of Gadavist injected.     FINDINGS:  Size: 102 grams  Hemorrhage: Absent  Peripheral zone: Heterogeneous on T2-weighted images. Regions of  mildly decreased signal on ADC or DWI which are best characterized as  PI-RADS 2 without highly suspicious lesion.  Transition zone: Enlarged with BPH changes. Transition zone nodules  which are circumscribed or mostly encapsulated without diffusion  restriction.  PI-RADS 2.  No highly suspicious nodules.     Neurovascular bundles: No neurovascular bundle involvement by  malignancy.  Seminal vesicles: No seminal vesicle involvement by malignancy.  Lymph nodes: No lymph node involvement  Bones: No suspicious lesions  Other pelvic organs: No additional findings.                                                                         IMPRESSION:  1. Based on the most suspicious abnormality, this exam is  characterized as PIRADS 2 - Clinically significant cancer is  unlikely  to be present.    2. No suspicious adenopathy or evidence of pelvic metastases.     Assessment & Plan   [place] 6 prostate cancer on active surveillance  Erectile dysfunction  Previous episode of urinary retention, isolated, self resolved    Is my pleasure to speak with Ulysses in regards to his history and current diagnosis of Amanda 6 prostate cancer on active surveillance with no significant change in the last 5 years.  After reviewing his clinical history, I am happy to let him know that his PSA has returned very stable as compared to his previous values, and that at this time I see no reason to move forward with any repeat MRIs or biopsies at this time.  We discussed standard active surveillance guidelines and that my recommendation at this point would be to get a PSA no more than once a year, and we will make decisions on when to repeat an MRI or biopsy based on changes to his PSA.  Ulysses expressed understanding and agreement with this plan.    In regards to his previous episode of urinary retention, I am glad to hear he remains to be symptom-free and so we removed the Flomax from his medication list.    In regards to his erectile dysfunction, I am glad to hear that he has not had much need of his sildenafil.  He would like to keep his prescription as part of his chart, though he still admits he is not using it very frequently at the moment.    Mr. Coleman expressed understanding and agreement to the above discussion and plan and all of his questions were answered to his satisfaction.     PLAN  Repeat PSA in 1 year with follow-up virtual visit with me shortly afterwards to discuss results    SIGNED    Christiano Salgado PA-C      I spent a total of 15 minutes spent on the date of the encounter doing chart review, history and exam, documentation, and further activities as noted above.    Virtual Visit Details    Type of service:  Video Visit   Video Start Time:  10:17 AM  Video End Time:10:24  AM    Originating Location (pt. Location): Home    Distant Location (provider location):  Off-site  Platform used for Video Visit: Alina

## 2024-11-29 ENCOUNTER — LAB (OUTPATIENT)
Dept: LAB | Facility: CLINIC | Age: 69
End: 2024-11-29
Payer: COMMERCIAL

## 2024-11-29 DIAGNOSIS — Z13.6 CARDIOVASCULAR SCREENING; LDL GOAL LESS THAN 160: ICD-10-CM

## 2024-11-29 LAB
CHOLEST SERPL-MCNC: 184 MG/DL
FASTING STATUS PATIENT QL REPORTED: YES
HDLC SERPL-MCNC: 55 MG/DL
LDLC SERPL CALC-MCNC: 111 MG/DL
NONHDLC SERPL-MCNC: 129 MG/DL
TRIGL SERPL-MCNC: 90 MG/DL

## 2024-11-29 PROCEDURE — 36415 COLL VENOUS BLD VENIPUNCTURE: CPT

## 2024-11-29 PROCEDURE — 80061 LIPID PANEL: CPT

## 2025-01-02 SDOH — HEALTH STABILITY: PHYSICAL HEALTH: ON AVERAGE, HOW MANY MINUTES DO YOU ENGAGE IN EXERCISE AT THIS LEVEL?: 40 MIN

## 2025-01-02 SDOH — HEALTH STABILITY: PHYSICAL HEALTH: ON AVERAGE, HOW MANY DAYS PER WEEK DO YOU ENGAGE IN MODERATE TO STRENUOUS EXERCISE (LIKE A BRISK WALK)?: 4 DAYS

## 2025-01-02 ASSESSMENT — SOCIAL DETERMINANTS OF HEALTH (SDOH): HOW OFTEN DO YOU GET TOGETHER WITH FRIENDS OR RELATIVES?: TWICE A WEEK

## 2025-01-09 ENCOUNTER — OFFICE VISIT (OUTPATIENT)
Dept: FAMILY MEDICINE | Facility: CLINIC | Age: 70
End: 2025-01-09
Payer: COMMERCIAL

## 2025-01-09 DIAGNOSIS — Z13.6 CARDIOVASCULAR SCREENING; LDL GOAL LESS THAN 160: Primary | ICD-10-CM

## 2025-01-09 DIAGNOSIS — Z00.00 WELLNESS EXAMINATION: ICD-10-CM

## 2025-01-09 DIAGNOSIS — C10.9 OROPHARYNGEAL CANCER (H): ICD-10-CM

## 2025-01-09 LAB
FASTING STATUS PATIENT QL REPORTED: NO
GLUCOSE SERPL-MCNC: 95 MG/DL (ref 70–99)

## 2025-01-09 PROCEDURE — 82947 ASSAY GLUCOSE BLOOD QUANT: CPT | Performed by: FAMILY MEDICINE

## 2025-01-09 PROCEDURE — G0439 PPPS, SUBSEQ VISIT: HCPCS | Performed by: FAMILY MEDICINE

## 2025-01-09 PROCEDURE — 36415 COLL VENOUS BLD VENIPUNCTURE: CPT | Performed by: FAMILY MEDICINE

## 2025-01-09 NOTE — PATIENT INSTRUCTIONS
Relaxation technique 1: Breathing meditation for stress relief  With its focus on full, cleansing breaths, deep breathing is a simple, yet powerful, relaxation technique. It s easy to learn, can be practiced almost anywhere, and provides a quick way to get your stress levels in check. Deep breathing is the cornerstone of many other relaxation practices, too, and can be combined with other relaxing elements such as aromatherapy and music. All you really need is a few minutes and a place to stretch out.  Practicing deep breathing meditation  The key to deep breathing is to breathe deeply from the abdomen, getting as much fresh air as possible in your lungs. When you take deep breaths from the abdomen, rather than shallow breaths from your upper chest, you inhale more oxygen. The more oxygen you get, the less tense, short of breath, and anxious you feel.  Sit comfortably with your back straight. Put one hand on your chest and the other on your stomach.   Breathe in through your nose. The hand on your stomach should rise. The hand on your chest should move very little.   Exhale through your mouth, pushing out as much air as you can while sumaya your abdominal muscles. The hand on your stomach should move in as you exhale, but your other hand should move very little.   Continue to breathe in through your nose and out through your mouth. Try to inhale enough so that your lower abdomen rises and falls. Count slowly as you exhale.   If you find it difficult breathing from your abdomen while sitting up, try lying on the floor. Put a small book on your stomach, and try to breathe so that the book rises as you inhale and falls as you exhale.   Relaxation technique 2: Progressive muscle relaxation for stress relief  Progressive muscle relaxation involves a two-step process in which you systematically tense and relax different muscle groups in the body.  With regular practice, progressive muscle relaxation gives you an  intimate familiarity with what tension--as well as complete relaxation--feels like in different parts of the body. This awareness helps you spot and counteract the first signs of the muscular tension that accompanies stress. And as your body relaxes, so will your mind. You can combine deep breathing with progressive muscle relaxation for an additional level of stress relief.  Practicing progressive muscle relaxation  Before practicing Progressive Muscle Relaxation, consult with your doctor if you have a history of muscle spasms, back problems, or other serious injuries that may be aggravated by tensing muscles.  Most progressive muscle relaxation practitioners start at the feet and work their way up to the face. For a sequence of muscle groups to follow, see the box below.  Loosen your clothing, take off your shoes, and get comfortable.   Take a few minutes to relax, breathing in and out in slow, deep breaths.   When you re relaxed and ready to start, shift your attention to your right foot. Take a moment to focus on the way it feels.   Slowly tense the muscles in your right foot, squeezing as tightly as you can. Hold for a count of 10.   Relax your right foot. Focus on the tension flowing away and the way your foot feels as it becomes limp and loose.   Stay in this relaxed state for a moment, breathing deeply and slowly.   When you re ready, shift your attention to your left foot. Follow the same sequence of muscle tension and release.   Move slowly up through your body, sumaya and relaxing the muscle groups as you go.   It may take some practice at first, but try not to tense muscles other than those intended.   Progressive Muscle Relaxation Sequence  The most popular sequence runs as follows:  Right foot*   Left foot   Right calf   Left calf   Right thigh  Left thigh   Hips and buttocks   Stomach   Chest   Back  Right arm and hand   Left arm and hand   Neck and shoulders   Face    * If you are left-handed  you may want to begin with your left foot instead.  Relaxation technique 3: Body scan meditation for stress relief  A body scan is similar to progressive muscle relaxation except, instead of tensing and relaxing muscles, you simply focus on the sensations in each part of your body.   Practicing body scan meditation  Lie on your back, legs uncrossed, arms relaxed at your sides, eyes open or closed. Focus on your breathing , allowing your stomach to rise as you inhale and fall as you exhale. Breathe deeply for about two minutes, until you start to feel comfortable and relaxed.   Turn your focus to the toes of your right foot. Notice any sensations you feel while continuing to also focus on your breathing. Imagine each deep breath flowing to your toes. Remain focused on this area for one to two minutes.   Move your focus to the sole of your right foot. Tune in to any sensations you feel in that part of your body and imagine each breath flowing from the sole of your foot. After one or two minutes, move your focus to your right ankle and repeat. Move to your calf, knee, thigh, hip, and then repeat the sequence for your left leg. From there, move up the torso, through the lower back and abdomen, the upper back and chest, and the shoulders. Pay close attention to any area of the body that causes you pain or discomfort.   Move your focus to the fingers on your right hand and then move up to the wrist, forearm, elbow, upper arm, and shoulder. Repeat for your left arm. Then move through the neck and throat, and finally all the regions of your face, the back of the head, and the top of the head. Pay close attention to your jaw, chin, lips, tongue, nose, cheeks, eyes, forehead, temples and scalp. When you reach the very top of your head, let your breath reach out beyond your body and imagine yourself hovering above yourself.   After completing the body scan, relax for a while in silence and stillness, noting how your body  feels. Then open your eyes slowly. Take a moment to stretch, if necessary.   For a guided body scan meditation, see the Resources section below.  Relaxation technique 4: Mindfulness for stress relief  Mindfulness is the ability to remain aware of how you re feeling right now, your  [id number]-sjjlzh  experience--both internal and external. Thinking about the past--blaming and judging yourself--or worrying about the future can often lead to a degree of stress that is overwhelming. But by staying calm and focused in the present moment, you can bring your nervous system back into balance. Mindfulness can be applied to activities such as walking, exercising, eating, or meditation.  Meditations that cultivate mindfulness have long been used to reduce overwhelming stress. Some of these meditations bring you into the present by focusing your attention on a single repetitive action, such as your breathing, a few repeated words, or flickering light from a candle. Other forms of mindfulness meditation encourage you to follow and then release internal thoughts or sensations.  Practicing mindfulness meditation  Key points in mindfulness mediation are:  A quiet environment. Choose a secluded place in your home, office, garden, place of Rastafarian, or in the great outdoors where you can relax without distractions or interruptions.   A comfortable position. Get comfortable, but avoid lying down as this may lead to you falling asleep. Sit up with your spine straight, either in a chair or on the floor. You can also try a cross-legged or unique position.   A point of focus. This point can be internal - a feeling or imaginary scene - or something external - a flame or meaningful word or phrase that you repeat it throughout your session. You may meditate with eyes open or closed. Also choose to focus on an object in your surroundings to enhance your concentration, or alternately, you can close your eyes.   An observant, noncritical  attitude. Don t worry about distracting thoughts that go through your mind or about how well you re doing. If thoughts intrude during your relaxation session, don t fight them. Instead, gently turn your attention back to your point of focus.   Relaxation technique 5: Visualization meditation for stress relief  Visualization, or guided imagery, is a variation on traditional meditation that requires you to employ not only your visual sense, but also your sense of taste, touch, smell, and sound. When used as a relaxation technique, visualization involves imagining a scene in which you feel at peace, free to let go of all tension and anxiety.  Choose whatever setting is most calming to you, whether it s a tropical beach, a favorite childhood spot, or a quiet wooded [place]. You can do this visualization exercise on your own in silence, while listening to soothing music, or with a therapist (or an audio recording of a therapist) guiding you through the imagery. To help you employ your sense of hearing you can use a sound machine or download sounds that match your chosen setting--the sound of ocean waves if you ve chosen a beach, for example.  Practicing visualization  Find a quiet, relaxed place. Beginners sometimes fall asleep during a visualization meditation, so you might try sitting up or standing.  Close your eyes and let your worries drift away. Imagine your restful place. Picture it as vividly as you can--everything you can see, hear, smell, and feel. Visualization works best if you incorporate as many sensory details as possible, using at least three of your senses. When visualizing, choose imagery that appeals to you; don t select images because someone else suggests them, or because you think they should be appealing. Let your own images come up and work for you.  If you are thinking about a dock on a quiet lake, for example:   Walk slowly around the dock and notice the colors and textures around you.   Spend some  time exploring each of your senses.   See the sun setting over the water.   Hear the birds singing.   Smell the pine trees.   Feel the cool water on your bare feet.   Taste the fresh, clean air.   Enjoy the feeling of deep relaxation that envelopes you as you slowly explore your restful place. When you are ready, gently open your eyes and come back to the present.  Don't worry if you sometimes zone out or lose track of where you are during a guided imagery session. This is normal. You may also experience feelings of stiffness or heaviness in your limbs, minor, involuntary muscle-movements, or even cough or yawn. Again, these are normal responses.   Relaxation technique 6: Yoga and lisa chi for stress relief  Yoga involves a series of both moving and stationary poses, combined with deep breathing. As well as reducing anxiety and stress, yoga can also improve flexibility, strength, balance, and stamina. Practiced regularly, it can also strengthen the relaxation response in your daily life. Since injuries can happen when yoga is practiced incorrectly, it s best to learn by attending group classes, hiring a private teacher, or at least following video instructions.  What type of yoga is best for stress?  Although almost all yoga classes end in a relaxation pose, classes that emphasize slow, steady movement, deep breathing, and gentle stretching are best for stress relief.  Satyananda is a traditional form of yoga. It features gentle poses, deep relaxation, and meditation, making it suitable for beginners as well as anyone primarily looking for stress reduction.   Hatha yoga is also reasonably gentle way to relieve stress and is suitable for beginners. Alternately, look for labels like gentle, for stress relief, or for beginners when selecting a yoga class.   Power yoga, with its intense poses and focus on fitness, is better suited to those looking for stimulation as well as relaxation.   If you re unsure whether a  specific yoga class is appropriate for stress relief, call the studio or ask the teacher.  Dennis chi  If you ve ever seen a group of people in the park slowly moving in synch, you ve probably witnessed dennis chi. Dennis chi is a self-paced, non-competitive series of slow, flowing body movements. These movements emphasize concentration, relaxation, and the conscious circulation of vital energy throughout the body. Though dennis chi has its roots in martial arts, today it is primarily practiced as a way of calming the mind, conditioning the body, and reducing stress. As in meditation, dennis chi practitioners focus on their breathing and keeping their attention in the present moment.  Dennis chi is a safe, low-impact option for people of all ages and levels of fitness, including older adults and those recovering from injuries. Like yoga, once you ve learned the basics of dennis chi or qi gong, you can practice alone or with others, tailoring your sessions as you see fit.   Making relaxation techniques a part of your life  The best way to start and maintain a relaxation practice is to incorporate it into your daily routine. Between work, family, school, and other commitments, though, it can be tough for many people to find the time. Fortunately, many of the techniques can be practiced while you re doing other things.  Rhythmic exercise as a mindfulness relaxation technique  Rhythmic exercise--such as running, walking, rowing, or cycling--is most effective at relieving stress when performed with relaxation in mind. As with meditation, mindfulness requires being fully engaged in the present moment, focusing your mind on how your body feels right now. As you exercise, focus on the physicality of your body s movement and how your breathing complements that movement. If your mind wanders to other thoughts, gently return to focusing on your breathing and movement.  If walking or running, for example, focus on each step--the sensation of your  feet touching the ground, the rhythm of your breath while moving, and the feeling of the wind against your face.  Tips for fitting relaxation techniques into your life  If possible, schedule a set time to practice each day. Set aside one or two periods each day. You may find that it s easier to stick with your practice if you do it first thing in the morning, before other tasks and responsibilities get in the way.   Practice relaxation techniques while you re doing other things. Meditate while commuting to work on a bus or train, or waiting for a dentist appointment. Try deep breathing while you re doing housework or mowing the lawn. Mindfulness walking can be done while exercising your dog, walking to your car, or climbing the stairs at work instead of using the elevator. Once you ve learned techniques such as lisa chi, you can practice them in your office or in the park at lunchtime.   If you exercise, improve the relaxation benefits by adopting mindfulness. Instead of zoning out or staring at a TV as you exercise, try focusing your attention on your body. If you re resistance training, for example, focus on coordinating your breathing with your movements and pay attention to how your body feels as you raise and lower the weights.   Avoid practicing when you re sleepy. These techniques can relax you so much that they can make you very sleepy, especially if it s close to bedtime. You will get the most benefit if you practice when you re fully awake and alert. Do not practice after eating a heavy meal or while using drugs, tobacco, or alcohol.   Expect ups and downs. Don t be discouraged if you skip a few days or even a few weeks. It happens. Just get started again and slowly build up to your old momentum.

## 2025-01-09 NOTE — PROGRESS NOTES
Preventive Care Visit  [facility] SOPHIE Raines MD, Family Medicine  Jan 9, 2025      Assessment & Plan     Oropharyngeal cancer (H)  I discussed last year I had made referral to ENT,  He is now 13 years out. ENt exam  last year  68-year-old male with history of left oropharyngeal squamous cell carcinoma he is now 12 years from treatment.  Today's examination and flexible scope did not show any evidence of recurrence masses or lesions of concern.  I reassured the patient that the examination was negative.     CARDIOVASCULAR SCREENING; LDL GOAL LESS THAN 160    Wellness examination  - Glucose; Future  - Glucose    Patient has been advised of split billing requirements and indicates understanding: Yes    BMI  Estimated body mass index is 26.57 kg/m  as calculated from the following:    Height as of 3/18/24: 1.829 m (6').    Weight as of 3/18/24: 88.9 kg (195 lb 14.4 oz).   Weight management plan: Discussed healthy diet and exercise guidelines    Counseling  Appropriate preventive services were addressed with this patient via screening, questionnaire, or discussion as appropriate for fall prevention, nutrition, physical activity, Tobacco-use cessation, social engagement, weight loss and cognition.  Checklist reviewing preventive services available has been given to the patient.      Ashvin Arora is a 69 year old, presenting for the following:  Medicare Visit        1/9/2025    11:39 AM   Additional Questions   Roomed by Raquel   Accompanied by Self         HPI  Patient switched insurance and would like a wellness exam today  Would like to discuss options for dry skin  Declines vaccines today  Health Care Directive  Patient does not have a Health Care Directive: Discussed advance care planning with patient; information given to patient to review.      1/2/2025   General Health   How would you rate your overall physical health? Good   Feel stress (tense, anxious, or unable to sleep) To some  extent   (!) STRESS CONCERN      1/2/2025   Nutrition   Diet: Low fat/cholesterol         1/2/2025   Exercise   Days per week of moderate/strenous exercise 4 days   Average minutes spent exercising at this level 40 min         1/2/2025   Social Factors   Frequency of gathering with friends or relatives Twice a week   Worry food won't last until get money to buy more No   Food not last or not have enough money for food? No   Do you have housing? (Housing is defined as stable permanent housing and does not include staying ouside in a car, in a tent, in an abandoned building, in an overnight shelter, or couch-surfing.) Yes   Are you worried about losing your housing? No   Lack of transportation? No   Unable to get utilities (heat,electricity)? No         1/2/2025   Fall Risk   Fallen 2 or more times in the past year? No   Trouble with walking or balance? No          1/2/2025   Activities of Daily Living- Home Safety   Needs help with the following daily activites None of the above   Safety concerns in the home Throw rugs in the hallway         1/2/2025   Dental   Dentist two times every year? (!) NO         1/2/2025   Hearing Screening   Hearing concerns? None of the above         1/2/2025   Driving Risk Screening   Patient/family members have concerns about driving No         1/2/2025   General Alertness/Fatigue Screening   Have you been more tired than usual lately? No         1/2/2025   Urinary Incontinence Screening   Bothered by leaking urine in past 6 months No         3/18/2024   TB Screening   Were you born outside of the [place]? No         Today's PHQ-2 Score:       1/9/2025    11:01 AM   PHQ-2 ( 1999 [employer])   Q1: Little interest or pleasure in doing things 1   Q2: Feeling down, depressed or hopeless 1   PHQ-2 Score 2    Q1: Little interest or pleasure in doing things Several days   Q2: Feeling down, depressed or hopeless Several days   PHQ-2 Score 2       Patient-reported           1/2/2025   Substance Use    Alcohol more than 3/day or more than 7/wk No   Do you have a current opioid prescription? No   How severe/bad is pain from 1 to 10? 1/10   Do you use any other substances recreationally? No     Social History     Tobacco Use    Smoking status: Never    Smokeless tobacco: Never   Vaping Use    Vaping status: Never Used   Substance Use Topics    Alcohol use: No     Comment: none    Drug use: No           1/2/2025   AAA Screening   Family history of Abdominal Aortic Aneurysm (AAA)? No   Last PSA:   PSA   Date Value Ref Range Status   08/13/2020 6.04 (H) 0 - 4 ug/L Final     Comment:     Assay Method:  Chemiluminescence using Siemens Vista analyzer     Prostate Specific Antigen Screen   Date Value Ref Range Status   12/02/2021 7.11 (H) 0.00 - 4.00 ug/L Final     PSA Tumor Marker   Date Value Ref Range Status   05/22/2024 6.30 (H) 0.00 - 4.50 ng/mL Final   02/20/2023 6.67 (H) 0.00 - 4.00 ug/L Final     ASCVD Risk   The 10-year ASCVD risk score (Coleen DK, et al., 2019) is: 11.7%    Values used to calculate the score:      Age: 69 years      Sex: Male      Is Non- : No      Diabetic: No      Tobacco smoker: No      Systolic Blood Pressure: 118 mmHg      Is BP treated: No      HDL Cholesterol: 55 mg/dL      Total Cholesterol: 184 mg/dL          Reviewed and updated as needed this visit by Provider                  Current providers sharing in care for this patient include:  Patient Care Team:  Drew Raines MD as PCP - General (Family Practice)  Usha Rojas MD as MD (Otolaryngology)  Drew Raines MD as MD (Family Practice)  Drew Raines MD as Assigned PCP  Christiano Salgado PA-C as Assigned Surgical Provider    The following health maintenance items are reviewed in Epic and correct as of today:  Health Maintenance   Topic Date Due    INFLUENZA VACCINE (1) Never done    COVID-19 Vaccine (6 - 2024-25 season) 09/01/2024    MEDICARE ANNUAL WELLNESS VISIT   03/18/2025    ANNUAL REVIEW OF HM ORDERS  11/25/2025    LIPID  11/29/2025    FALL RISK ASSESSMENT  01/09/2026    GLUCOSE  01/09/2028    ADVANCE CARE PLANNING  01/09/2030    COLORECTAL CANCER SCREENING  07/29/2031    RSV VACCINE (1 - 1-dose 75+ series) 04/06/2032    DTAP/TDAP/TD IMMUNIZATION (4 - Td or Tdap) 11/28/2032    HEPATITIS C SCREENING  Completed    PHQ-2 (once per calendar year)  Completed    Pneumococcal Vaccine: 50+ Years  Completed    ZOSTER IMMUNIZATION  Completed    HPV IMMUNIZATION  Aged Out    MENINGITIS IMMUNIZATION  Aged Out    RSV MONOCLONAL ANTIBODY  Aged Out     Review of Systems  Constitutional, HEENT, cardiovascular, pulmonary, gi and gu systems are negative, except as otherwise noted.     Objective    Exam  There were no vitals taken for this visit.   Estimated body mass index is 26.57 kg/m  as calculated from the following:    Height as of 3/18/24: 1.829 m (6').    Weight as of 3/18/24: 88.9 kg (195 lb 14.4 oz).      Physical Exam  GENERAL: alert and no distress  NECK: no adenopathy, no asymmetry, masses, or scars  RESP: lungs clear to auscultation - no rales, rhonchi or wheezes  CV: regular rate and rhythm, normal S1 S2, no S3 or S4, no murmur, click or rub, no peripheral edema  ABDOMEN: soft, nontender, no hepatosplenomegaly, no masses and bowel sounds normal  MS: no gross musculoskeletal defects noted, no edema         1/9/2025   Mini Cog   Clock Draw Score 2 Normal   3 Item Recall 3 objects recalled   Mini Cog Total Score 5              Signed Electronically by: Drew Raines MD

## 2025-04-14 ENCOUNTER — LAB (OUTPATIENT)
Dept: LAB | Facility: CLINIC | Age: 70
End: 2025-04-14
Payer: COMMERCIAL

## 2025-04-14 DIAGNOSIS — C61 PROSTATE CANCER (H): ICD-10-CM

## 2025-04-14 LAB — PSA SERPL DL<=0.01 NG/ML-MCNC: 7.62 NG/ML (ref 0–4.5)

## 2025-04-14 PROCEDURE — 84153 ASSAY OF PSA TOTAL: CPT

## 2025-04-14 PROCEDURE — 36415 COLL VENOUS BLD VENIPUNCTURE: CPT

## 2025-05-14 ENCOUNTER — PRE VISIT (OUTPATIENT)
Dept: UROLOGY | Facility: CLINIC | Age: 70
End: 2025-05-14
Payer: COMMERCIAL

## 2025-05-14 NOTE — TELEPHONE ENCOUNTER
Reason for visit: Return     Relevant information: Yearly PSA follow up    Records/imaging/labs/orders: PSA 4/14/25    At Rooming: Virtual visit      Daljit Layne  5/14/2025  4:32 PM

## 2025-05-28 NOTE — PROGRESS NOTES
Virtual Visit Details    Type of service:  Video Visit   Video Start Time: 10:23 AM  Video End Time:10:29 AM    Originating Location (pt. Location): Home    Distant Location (provider location):  Off-site  Platform used for Video Visit: [facility]     Visit conducted via real-time audio/video technology by Christiano Salgado PA-C to the patient in their home.     Subjective      REASON FOR VISIT  Prostate cancer on active surveillance follow-up    HISTORY OF PRESENT ILLNESS  Mr. Coleman is a 70 year old male who I am speaking with today in follow-up for his history of [place] 6 prostate cancer currently on active surveillance.  His initial diagnosis was in 2017, with surveillance biopsy on 8/15/2022.    His other urologic history is significant for a isolated episode of urinary retention with subsequent resolution now not taking Flomax as well as erectile dysfunction currently with a prescription for sildenafil that he does not need to use consistently.    Today:  No new questions comments or concerns  Is still not taking the sildenafil really at all    Objective      PHYSICAL EXAMINATION  Deferred given virtual visit.    LABS  Lab Results   Component Value Date    PSA 7.62 (H) 04/14/2025    PSA 6.30 (H) 05/22/2024    PSA 5.69 (H) 11/24/2023    PSA 10.90 (H) 10/30/2023    PSA 6.67 (H) 02/20/2023    PSA 7.11 (H) 12/02/2021    PSA 6.04 (H) 08/13/2020    PSA 3.32 10/01/2014      Most up to date biopsy from 8/15/22:     Final Diagnosis   A. Prostate, left base, needle biopsy:  - Prostate adenocarcinoma, acinar type  - [place] score 6 (3+3)  - Grade group 1  - Extent: 1 of 2 cores (2.5 mm, 20%)  - High grade prostatic intraepithelial neoplasia     B. Prostate, left mid, needle biopsy:  - Benign prostate tissue      C. Prostate, left apex, needle biopsy:  - Benign prostate tissue      D. Prostate, right base, needle biopsy:  - Benign prostate tissue      E. Prostate, right mid, needle biopsy:  - Benign prostate tissue      F.  Prostate, right apex, needle biopsy:  - Benign prostate tissue      G. Prostate, left transition zone, needle biopsy:  - Benign prostate tissue      H. Prostate, right transition zone, needle biopsy:  - Benign prostate tissue      IMAGING  Most up-to-date prostate MRI    Narrative & Impression   MRI PROSTATE: 1/6/2022 10:04 AM     CLINICAL HISTORY: Elevated prostate specific antigen (PSA)     Most Recent PSA: 7.11 ug/L on 12/2/2021     Comparison: None.     TECHNIQUE:  The following sequences were obtained: High-resolution axial  T2-weighted, coronal T2-weighted, 3D volumetric T2-weighted, axial  pre-contrast T1, axial diffusion-weighted, axial apparent diffusion  coefficient and axial dynamic contrast-enhanced T1. Postcontrast  images were evaluated on a separate workstation to evaluate dynamic  contrast enhancement. The technique of this exam is PI-RADS v2.1  compliant. Contrast dose: 10cc of Gadavist injected.     FINDINGS:  Size: 102 grams  Hemorrhage: Absent  Peripheral zone: Heterogeneous on T2-weighted images. Regions of  mildly decreased signal on ADC or DWI which are best characterized as  PI-RADS 2 without highly suspicious lesion.  Transition zone: Enlarged with BPH changes. Transition zone nodules  which are circumscribed or mostly encapsulated without diffusion  restriction.  PI-RADS 2.  No highly suspicious nodules.     Neurovascular bundles: No neurovascular bundle involvement by  malignancy.  Seminal vesicles: No seminal vesicle involvement by malignancy.  Lymph nodes: No lymph node involvement  Bones: No suspicious lesions  Other pelvic organs: No additional findings.                                                                         IMPRESSION:  1. Based on the most suspicious abnormality, this exam is  characterized as PIRADS 2 - Clinically significant cancer is unlikely  to be present.    2. No suspicious adenopathy or evidence of pelvic metastases.     Assessment & Plan    [place] 6  prostate cancer on active surveillance  Erectile dysfunction  Previous episode of urinary retention, isolated, self resolved    Is my pleasure to speak with Ulysses in regards to his history and current diagnosis of [place] 6 prostate cancer on active surveillance with no significant change in the last 5 years.  After reviewing his clinical history, I am happy to let him know that his PSA remains stable as compared to values dating back to 2020.  With this in mind, there is no further recommendations outside of repeating a PSA in 1 year with a follow-up visit with me shortly afterwards.    Mr. Coleman expressed understanding and agreement to the above discussion and plan and all of his questions were answered to his satisfaction.     PLAN  Repeat PSA in 1 year with follow-up virtual visit with me shortly afterwards to discuss results    SIGNED    Christiano Salgado PA-C      I spent a total of 6 minutes spent on the date of the encounter doing chart review, history and exam, documentation, and further activities as noted above.

## 2025-05-29 ENCOUNTER — VIRTUAL VISIT (OUTPATIENT)
Dept: UROLOGY | Facility: CLINIC | Age: 70
End: 2025-05-29
Payer: COMMERCIAL

## 2025-05-29 DIAGNOSIS — N52.9 ERECTILE DYSFUNCTION, UNSPECIFIED ERECTILE DYSFUNCTION TYPE: ICD-10-CM

## 2025-05-29 DIAGNOSIS — C61 PROSTATE CANCER (H): Primary | ICD-10-CM

## 2025-05-29 ASSESSMENT — PAIN SCALES - GENERAL: PAINLEVEL_OUTOF10: NO PAIN (0)

## 2025-05-29 NOTE — NURSING NOTE
Current patient location: [address] [place] [id number]    Is the patient currently in the state of [place]? YES    Visit mode: VIDEO    If the visit is dropped, the patient can be reconnected by:VIDEO VISIT: Send to e-mail at: [email or web address]    Will anyone else be joining the visit? NO  (If patient encounters technical issues they should call [phone]    Are changes needed to the allergy or medication list? No    Are refills needed on medications prescribed by this physician? NO    Rooming Documentation:  Questionnaire(s) completed    Reason for visit: DINESH THRONTON

## 2025-05-29 NOTE — LETTER
5/29/2025       RE: Karlos Coleman  [address] [facility] [address]     Dear Colleague,    Thank you for referring your patient, Karlos Coleman, to the [facility] [place] at [facility]. Please see a copy of my visit note below.    Virtual Visit Details    Type of service:  Video Visit   Video Start Time: 10:23 AM  Video End Time:10:29 AM    Originating Location (pt. Location): Home    Distant Location (provider location):  Off-site  Platform used for Video Visit: [facility]     Visit conducted via real-time audio/video technology by Christiano Salgado PA-C to the patient in their home.     Subjective     REASON FOR VISIT  Prostate cancer on active surveillance follow-up    HISTORY OF PRESENT ILLNESS  Mr. Coleman is a 70 year old male who I am speaking with today in follow-up for his history of Amanda 6 prostate cancer currently on active surveillance.  His initial diagnosis was in 2017, with surveillance biopsy on 8/15/2022.    His other urologic history is significant for a isolated episode of urinary retention with subsequent resolution now not taking Flomax as well as erectile dysfunction currently with a prescription for sildenafil that he does not need to use consistently.    Today:  No new questions comments or concerns  Is still not taking the sildenafil really at all    Objective     PHYSICAL EXAMINATION  Deferred given virtual visit.    LABS  Lab Results   Component Value Date    PSA 7.62 (H) 04/14/2025    PSA 6.30 (H) 05/22/2024    PSA 5.69 (H) 11/24/2023    PSA 10.90 (H) 10/30/2023    PSA 6.67 (H) 02/20/2023    PSA 7.11 (H) 12/02/2021    PSA 6.04 (H) 08/13/2020    PSA 3.32 10/01/2014      Most up to date biopsy from 8/15/22:     Final Diagnosis   A. Prostate, left base, needle biopsy:  - Prostate adenocarcinoma, acinar type  - Amanda score 6 (3+3)  - Grade group 1  - Extent: 1 of 2 cores (2.5 mm, 20%)  - High grade prostatic  intraepithelial neoplasia     B. Prostate, left mid, needle biopsy:  - Benign prostate tissue      C. Prostate, left apex, needle biopsy:  - Benign prostate tissue      D. Prostate, right base, needle biopsy:  - Benign prostate tissue      E. Prostate, right mid, needle biopsy:  - Benign prostate tissue      F. Prostate, right apex, needle biopsy:  - Benign prostate tissue      G. Prostate, left transition zone, needle biopsy:  - Benign prostate tissue      H. Prostate, right transition zone, needle biopsy:  - Benign prostate tissue      IMAGING  Most up-to-date prostate MRI    Narrative & Impression   MRI PROSTATE: 1/6/2022 10:04 AM     CLINICAL HISTORY: Elevated prostate specific antigen (PSA)     Most Recent PSA: 7.11 ug/L on 12/2/2021     Comparison: None.     TECHNIQUE:  The following sequences were obtained: High-resolution axial  T2-weighted, coronal T2-weighted, 3D volumetric T2-weighted, axial  pre-contrast T1, axial diffusion-weighted, axial apparent diffusion  coefficient and axial dynamic contrast-enhanced T1. Postcontrast  images were evaluated on a separate workstation to evaluate dynamic  contrast enhancement. The technique of this exam is PI-RADS v2.1  compliant. Contrast dose: 10cc of Gadavist injected.     FINDINGS:  Size: 102 grams  Hemorrhage: Absent  Peripheral zone: Heterogeneous on T2-weighted images. Regions of  mildly decreased signal on ADC or DWI which are best characterized as  PI-RADS 2 without highly suspicious lesion.  Transition zone: Enlarged with BPH changes. Transition zone nodules  which are circumscribed or mostly encapsulated without diffusion  restriction.  PI-RADS 2.  No highly suspicious nodules.     Neurovascular bundles: No neurovascular bundle involvement by  malignancy.  Seminal vesicles: No seminal vesicle involvement by malignancy.  Lymph nodes: No lymph node involvement  Bones: No suspicious lesions  Other pelvic organs: No additional findings.                                                                          IMPRESSION:  1. Based on the most suspicious abnormality, this exam is  characterized as PIRADS 2 - Clinically significant cancer is unlikely  to be present.    2. No suspicious adenopathy or evidence of pelvic metastases.     Assessment & Plan   [place] 6 prostate cancer on active surveillance  Erectile dysfunction  Previous episode of urinary retention, isolated, self resolved    Is my pleasure to speak with Ulysses in regards to his history and current diagnosis of [place] 6 prostate cancer on active surveillance with no significant change in the last 5 years.  After reviewing his clinical history, I am happy to let him know that his PSA remains stable as compared to values dating back to 2020.  With this in mind, there is no further recommendations outside of repeating a PSA in 1 year with a follow-up visit with me shortly afterwards.    Mr. Coleman expressed understanding and agreement to the above discussion and plan and all of his questions were answered to his satisfaction.     PLAN  Repeat PSA in 1 year with follow-up virtual visit with me shortly afterwards to discuss results    SIGNED    Christiano Salgado PA-C      I spent a total of 6 minutes spent on the date of the encounter doing chart review, history and exam, documentation, and further activities as noted above.    Again, thank you for allowing me to participate in the care of your patient.      Sincerely,    Christiano Salgado PA-C

## 2025-08-01 ENCOUNTER — OFFICE VISIT (OUTPATIENT)
Dept: FAMILY MEDICINE | Facility: CLINIC | Age: 70
End: 2025-08-01
Payer: COMMERCIAL

## 2025-08-01 ENCOUNTER — ANCILLARY PROCEDURE (OUTPATIENT)
Dept: GENERAL RADIOLOGY | Facility: CLINIC | Age: 70
End: 2025-08-01
Payer: COMMERCIAL

## 2025-08-01 VITALS
BODY MASS INDEX: 26.95 KG/M2 | OXYGEN SATURATION: 98 % | SYSTOLIC BLOOD PRESSURE: 109 MMHG | DIASTOLIC BLOOD PRESSURE: 69 MMHG | TEMPERATURE: 97.3 F | WEIGHT: 199 LBS | HEART RATE: 62 BPM | HEIGHT: 72 IN | RESPIRATION RATE: 14 BRPM

## 2025-08-01 DIAGNOSIS — M25.561 CHRONIC PAIN OF RIGHT KNEE: ICD-10-CM

## 2025-08-01 DIAGNOSIS — G89.29 CHRONIC PAIN OF RIGHT KNEE: ICD-10-CM

## 2025-08-01 DIAGNOSIS — Z87.828 HISTORY OF TORN MENISCUS OF RIGHT KNEE: ICD-10-CM

## 2025-08-01 DIAGNOSIS — M54.9 UPPER BACK PAIN ON RIGHT SIDE: Primary | ICD-10-CM

## 2025-08-01 LAB
ALBUMIN SERPL BCG-MCNC: 4.2 G/DL (ref 3.5–5.2)
ALP SERPL-CCNC: 65 U/L (ref 40–150)
ALT SERPL W P-5'-P-CCNC: 26 U/L (ref 0–70)
ANION GAP SERPL CALCULATED.3IONS-SCNC: 10 MMOL/L (ref 7–15)
AST SERPL W P-5'-P-CCNC: 28 U/L (ref 0–45)
BILIRUB SERPL-MCNC: 0.5 MG/DL
BUN SERPL-MCNC: 22.1 MG/DL (ref 8–23)
CALCIUM SERPL-MCNC: 9.3 MG/DL (ref 8.8–10.4)
CHLORIDE SERPL-SCNC: 104 MMOL/L (ref 98–107)
CREAT SERPL-MCNC: 0.96 MG/DL (ref 0.67–1.17)
EGFRCR SERPLBLD CKD-EPI 2021: 86 ML/MIN/1.73M2
ERYTHROCYTE [DISTWIDTH] IN BLOOD BY AUTOMATED COUNT: 12.2 % (ref 10–15)
GLUCOSE SERPL-MCNC: 99 MG/DL (ref 70–99)
HCO3 SERPL-SCNC: 26 MMOL/L (ref 22–29)
HCT VFR BLD AUTO: 43.6 % (ref 40–53)
HGB BLD-MCNC: 14.9 G/DL (ref 13.3–17.7)
MCH RBC QN AUTO: 30.9 PG (ref 26.5–33)
MCHC RBC AUTO-ENTMCNC: 34.2 G/DL (ref 31.5–36.5)
MCV RBC AUTO: 91 FL (ref 78–100)
PLATELET # BLD AUTO: 171 10E3/UL (ref 150–450)
POTASSIUM SERPL-SCNC: 5.1 MMOL/L (ref 3.4–5.3)
PROT SERPL-MCNC: 6.5 G/DL (ref 6.4–8.3)
RBC # BLD AUTO: 4.82 10E6/UL (ref 4.4–5.9)
SODIUM SERPL-SCNC: 140 MMOL/L (ref 135–145)
WBC # BLD AUTO: 4.8 10E3/UL (ref 4–11)

## 2025-08-01 PROCEDURE — 80053 COMPREHEN METABOLIC PANEL: CPT

## 2025-08-01 PROCEDURE — 99213 OFFICE O/P EST LOW 20 MIN: CPT

## 2025-08-01 PROCEDURE — 73562 X-RAY EXAM OF KNEE 3: CPT | Mod: TC | Performed by: RADIOLOGY

## 2025-08-01 PROCEDURE — G2211 COMPLEX E/M VISIT ADD ON: HCPCS

## 2025-08-01 PROCEDURE — 1125F AMNT PAIN NOTED PAIN PRSNT: CPT

## 2025-08-01 PROCEDURE — 3078F DIAST BP <80 MM HG: CPT

## 2025-08-01 PROCEDURE — 85027 COMPLETE CBC AUTOMATED: CPT

## 2025-08-01 PROCEDURE — 36415 COLL VENOUS BLD VENIPUNCTURE: CPT

## 2025-08-01 PROCEDURE — 3074F SYST BP LT 130 MM HG: CPT

## 2025-08-01 ASSESSMENT — PAIN SCALES - GENERAL: PAINLEVEL_OUTOF10: SEVERE PAIN (7)

## 2025-08-01 ASSESSMENT — ENCOUNTER SYMPTOMS: BACK PAIN: 1

## 2025-08-11 ENCOUNTER — THERAPY VISIT (OUTPATIENT)
Dept: PHYSICAL THERAPY | Facility: REHABILITATION | Age: 70
End: 2025-08-11
Payer: COMMERCIAL

## 2025-08-11 DIAGNOSIS — G89.29 CHRONIC PAIN OF RIGHT KNEE: ICD-10-CM

## 2025-08-11 DIAGNOSIS — M25.561 CHRONIC PAIN OF RIGHT KNEE: ICD-10-CM

## 2025-08-11 DIAGNOSIS — Z87.828 HISTORY OF TORN MENISCUS OF RIGHT KNEE: ICD-10-CM

## 2025-08-11 PROCEDURE — 97110 THERAPEUTIC EXERCISES: CPT | Mod: GP | Performed by: PHYSICAL THERAPIST

## 2025-08-11 PROCEDURE — 97161 PT EVAL LOW COMPLEX 20 MIN: CPT | Mod: GP | Performed by: PHYSICAL THERAPIST

## 2025-08-11 ASSESSMENT — ACTIVITIES OF DAILY LIVING (ADL)
KNEEL ON THE FRONT OF YOUR KNEE: ACTIVITY IS MINIMALLY DIFFICULT
KNEEL ON THE FRONT OF YOUR KNEE: ACTIVITY IS MINIMALLY DIFFICULT
RISE FROM A CHAIR: ACTIVITY IS NOT DIFFICULT
PLEASE_INDICATE_YOR_PRIMARY_REASON_FOR_REFERRAL_TO_THERAPY:: KNEE
GIVING WAY, BUCKLING OR SHIFTING OF KNEE: I DO NOT HAVE THE SYMPTOM
RAW_SCORE: 67
GIVING WAY, BUCKLING OR SHIFTING OF KNEE: I DO NOT HAVE THE SYMPTOM
LIMPING: I DO NOT HAVE THE SYMPTOM
WALK: ACTIVITY IS NOT DIFFICULT
STAND: ACTIVITY IS NOT DIFFICULT
SQUAT: ACTIVITY IS NOT DIFFICULT
AS_A_RESULT_OF_YOUR_KNEE_INJURY,_HOW_WOULD_YOU_RATE_YOUR_CURRENT_LEVEL_OF_DAILY_ACTIVITY?: NEARLY NORMAL
KNEE_ACTIVITY_OF_DAILY_LIVING_SCORE: 95.71
SIT WITH YOUR KNEE BENT: ACTIVITY IS NOT DIFFICULT
HOW_WOULD_YOU_RATE_THE_CURRENT_FUNCTION_OF_YOUR_KNEE_DURING_YOUR_USUAL_DAILY_ACTIVITIES_ON_A_SCALE_FROM_0_TO_100_WITH_100_BEING_YOUR_LEVEL_OF_KNEE_FUNCTION_PRIOR_TO_YOUR_INJURY_AND_0_BEING_THE_INABILITY_TO_PERFORM_ANY_OF_YOUR_USUAL_DAILY_ACTIVITIES?: 95
SWELLING: I DO NOT HAVE THE SYMPTOM
WALK: ACTIVITY IS NOT DIFFICULT
HOW_WOULD_YOU_RATE_THE_OVERALL_FUNCTION_OF_YOUR_KNEE_DURING_YOUR_USUAL_DAILY_ACTIVITIES?: NEARLY NORMAL
LIMPING: I DO NOT HAVE THE SYMPTOM
SWELLING: I DO NOT HAVE THE SYMPTOM
GO DOWN STAIRS: ACTIVITY IS NOT DIFFICULT
GO UP STAIRS: ACTIVITY IS NOT DIFFICULT
GO UP STAIRS: ACTIVITY IS NOT DIFFICULT
KNEE_ACTIVITY_OF_DAILY_LIVING_SUM: 67
GO DOWN STAIRS: ACTIVITY IS NOT DIFFICULT
RISE FROM A CHAIR: ACTIVITY IS NOT DIFFICULT
STAND: ACTIVITY IS NOT DIFFICULT
STIFFNESS: I DO NOT HAVE THE SYMPTOM
AS_A_RESULT_OF_YOUR_KNEE_INJURY,_HOW_WOULD_YOU_RATE_YOUR_CURRENT_LEVEL_OF_DAILY_ACTIVITY?: NEARLY NORMAL
SQUAT: ACTIVITY IS NOT DIFFICULT
PAIN: THE SYMPTOM AFFECTS MY ACTIVITY SLIGHTLY
PAIN: THE SYMPTOM AFFECTS MY ACTIVITY SLIGHTLY
WEAKNESS: I DO NOT HAVE THE SYMPTOM
SIT WITH YOUR KNEE BENT: ACTIVITY IS NOT DIFFICULT
HOW_WOULD_YOU_RATE_THE_OVERALL_FUNCTION_OF_YOUR_KNEE_DURING_YOUR_USUAL_DAILY_ACTIVITIES?: NEARLY NORMAL
HOW_WOULD_YOU_RATE_THE_CURRENT_FUNCTION_OF_YOUR_KNEE_DURING_YOUR_USUAL_DAILY_ACTIVITIES_ON_A_SCALE_FROM_0_TO_100_WITH_100_BEING_YOUR_LEVEL_OF_KNEE_FUNCTION_PRIOR_TO_YOUR_INJURY_AND_0_BEING_THE_INABILITY_TO_PERFORM_ANY_OF_YOUR_USUAL_DAILY_ACTIVITIES?: 95
WEAKNESS: I DO NOT HAVE THE SYMPTOM
STIFFNESS: I DO NOT HAVE THE SYMPTOM

## 2025-08-17 ENCOUNTER — OFFICE VISIT (OUTPATIENT)
Dept: URGENT CARE | Facility: URGENT CARE | Age: 70
End: 2025-08-17
Payer: COMMERCIAL

## 2025-08-17 VITALS
OXYGEN SATURATION: 97 % | DIASTOLIC BLOOD PRESSURE: 78 MMHG | HEART RATE: 57 BPM | HEIGHT: 72 IN | WEIGHT: 199.3 LBS | SYSTOLIC BLOOD PRESSURE: 122 MMHG | TEMPERATURE: 97.2 F | BODY MASS INDEX: 26.99 KG/M2 | RESPIRATION RATE: 12 BRPM

## 2025-08-17 DIAGNOSIS — H57.89 CONTACT LENS STUCK: Primary | ICD-10-CM

## 2025-08-17 PROCEDURE — 99214 OFFICE O/P EST MOD 30 MIN: CPT | Performed by: FAMILY MEDICINE

## 2025-08-17 PROCEDURE — 3074F SYST BP LT 130 MM HG: CPT | Performed by: FAMILY MEDICINE

## 2025-08-17 PROCEDURE — 3078F DIAST BP <80 MM HG: CPT | Performed by: FAMILY MEDICINE

## 2025-08-17 RX ORDER — NAPROXEN 250 MG/1
250 TABLET ORAL 3 TIMES DAILY PRN
Qty: 30 TABLET | Refills: 0 | Status: SHIPPED | OUTPATIENT
Start: 2025-08-17 | End: 2025-08-17

## (undated) DEVICE — KIT ENDO TURNOVER/PROCEDURE CARRY-ON 101822

## (undated) DEVICE — SOL WATER IRRIG 500ML BOTTLE 2F7113

## (undated) DEVICE — ENDO SNARE EXACTO COLD 9MM LOOP 2.4MMX230CM 00711115

## (undated) DEVICE — SUCTION MANIFOLD NEPTUNE 2 SYS 1 PORT 702-025-000

## (undated) DEVICE — SPECIMEN CONTAINER 3OZ W/FORMALIN 59901

## (undated) DEVICE — GOWN IMPERVIOUS 2XL BLUE

## (undated) DEVICE — TUBING SUCTION 12"X1/4" N612

## (undated) RX ORDER — GENTAMICIN 40 MG/ML
INJECTION, SOLUTION INTRAMUSCULAR; INTRAVENOUS
Status: DISPENSED
Start: 2022-08-15

## (undated) RX ORDER — LIDOCAINE HYDROCHLORIDE 10 MG/ML
INJECTION, SOLUTION EPIDURAL; INFILTRATION; INTRACAUDAL; PERINEURAL
Status: DISPENSED
Start: 2022-08-15